# Patient Record
Sex: FEMALE | Race: WHITE | Employment: FULL TIME | URBAN - METROPOLITAN AREA
[De-identification: names, ages, dates, MRNs, and addresses within clinical notes are randomized per-mention and may not be internally consistent; named-entity substitution may affect disease eponyms.]

---

## 2022-03-21 PROBLEM — O09.529 AMA (ADVANCED MATERNAL AGE) MULTIGRAVIDA 35+: Status: ACTIVE | Noted: 2022-03-21

## 2022-03-24 PROBLEM — O09.529 AMA (ADVANCED MATERNAL AGE) MULTIGRAVIDA 35+: Status: ACTIVE | Noted: 2022-03-21

## 2022-05-20 ENCOUNTER — HOSPITAL ENCOUNTER (EMERGENCY)
Age: 37
Discharge: HOME | End: 2022-05-20
Attending: OBSTETRICS & GYNECOLOGY | Admitting: OBSTETRICS & GYNECOLOGY
Payer: COMMERCIAL

## 2022-05-20 VITALS — HEART RATE: 98 BPM | SYSTOLIC BLOOD PRESSURE: 109 MMHG | DIASTOLIC BLOOD PRESSURE: 63 MMHG | TEMPERATURE: 98.3 F

## 2022-05-20 PROCEDURE — 76815 OB US LIMITED FETUS(S): CPT

## 2022-05-20 PROCEDURE — 99283 EMERGENCY DEPT VISIT LOW MDM: CPT

## 2022-05-20 NOTE — DISCHARGE INSTRUCTIONS
Patient Education   Patient Education        Preventing Falls: Care Instructions  Your Care Instructions     Getting around your home safely can be a challenge if you have injuries or health problems that make it easy for you to fall. Loose rugs and furniture in walkways are among the dangers for many older people who have problems walking or who have poor eyesight. People who have conditions such as arthritis, osteoporosis, or dementia also have to be careful not to fall. You can make your home safer with a few simple measures. Follow-up care is a key part of your treatment and safety. Be sure to make and go to all appointments, and call your doctor if you are having problems. It's also a good idea to know your test results and keep a list of the medicines you take. How can you care for yourself at home? Taking care of yourself  · Exercise regularly to improve your strength, muscle tone, and balance. Walk if you can. Swimming may be a good choice if you cannot walk easily. · Have your vision and hearing checked each year or any time you notice a change. If you have trouble seeing and hearing, you might not be able to avoid objects and could lose your balance. · Know the side effects of the medicines you take. Ask your doctor or pharmacist whether the medicines you take can affect your balance. Sleeping pills or sedatives can affect your balance. · Limit the amount of alcohol you drink. Alcohol can impair your balance and other senses. · Ask your doctor whether calluses or corns on your feet need to be removed. If you wear loose-fitting shoes because of calluses or corns, you can lose your balance and fall. · Talk to your doctor if you have numbness in your feet. · You may get dizzy if you do not drink enough water. To prevent dehydration, drink plenty of fluids. Choose water and other clear liquids.  If you have kidney, heart, or liver disease and have to limit fluids, talk with your doctor before you increase the amount of fluids you drink. Preventing falls at home  · Remove raised doorway thresholds, throw rugs, and clutter. Repair loose carpet or raised areas in the floor. · Move furniture and electrical cords to keep them out of walking paths. · Use nonskid floor wax, and wipe up spills right away, especially on ceramic tile floors. · If you use a walker or cane, put rubber tips on it. If you use crutches, clean the bottoms of them regularly with an abrasive pad, such as steel wool. · Keep your house well lit, especially Kathee Deis, and outside walkways. Use night-lights in areas such as hallways and bathrooms. Add extra light switches or use remote switches (such as switches that go on or off when you clap your hands) to make it easier to turn lights on if you have to get up during the night. · Install sturdy handrails on stairways. · Move items in your cabinets so that the things you use a lot are on the lower shelves (about waist level). · Keep a cordless phone and a flashlight with new batteries by your bed. If possible, put a phone in each of the main rooms of your house, or carry a cell phone in case you fall and cannot reach a phone. Or, you can wear a device around your neck or wrist. You push a button that sends a signal for help. · Wear low-heeled shoes that fit well and give your feet good support. Use footwear with nonskid soles. Check the heels and soles of your shoes for wear. Repair or replace worn heels or soles. · Do not wear socks without shoes on wood floors. · Walk on the grass when the sidewalks are slippery. If you live in an area that gets snow and ice in the winter, sprinkle salt on slippery steps and sidewalks. Or ask a family member or friend to do this for you. Preventing falls in the bath  · Install grab bars and nonskid mats inside and outside your shower or tub and near the toilet and sinks. · Use shower chairs and bath benches.   · Use a hand-held shower head that will allow you to sit while showering. · Get into a tub or shower by putting the weaker leg in first. Get out of a tub or shower with your strong side first.  · Repair loose toilet seats and consider installing a raised toilet seat to make getting on and off the toilet easier. · Keep your bathroom door unlocked while you are in the shower. Where can you learn more? Go to http://www.hahn.com/  Enter G117 in the search box to learn more about \"Preventing Falls: Care Instructions. \"  Current as of: September 8, 2021               Content Version: 13.2  © 3410-9433 [employer]. Care instructions adapted under license by [employer] (which disclaims liability or warranty for this information). If you have questions about a medical condition or this instruction, always ask your healthcare professional. [place] [address] any warranty or liability for your use of this information. Vaginal Bleeding During Pregnancy: Care Instructions  Overview     It's common to have some vaginal bleeding when you are pregnant. In some cases, the bleeding isn't serious. And there aren't any more problems with the pregnancy. But sometimes bleeding is a sign of a more serious problem. This is more common if the bleeding is heavy or painful. Examples of more serious problems include miscarriage, an ectopic pregnancy, and a problem with the placenta. You may have to see your doctor again to be sure everything is okay. You may also need more tests to find the cause of the bleeding. Home treatment may be all you need. But it depends on what is causing the bleeding. Be sure to tell your doctor if you have any new symptoms or if your symptoms get worse. The doctor has checked you carefully, but problems can develop later. If you notice any problems or new symptoms, get medical treatment right away. Follow-up care is a key part of your treatment and safety.  Be sure to make and go to all appointments, and call your doctor if you are having problems. It's also a good idea to know your test results and keep a list of the medicines you take. How can you care for yourself at home? · If your doctor prescribed medicines, take them exactly as directed. Call your doctor if you think you are having a problem with your medicine. · Do not have sex until your doctor says it's okay. · Do not put anything in your vagina until your doctor says it's okay. · Ask your doctor about other activities you can or can't do. · Get a lot of rest. Being pregnant can make you tired. · Eat a healthy diet. Include a lot of peas, beans, and leafy green vegetables. Talk to a dietitian if you need help planning your diet. · Do not use nonsteroidal anti-inflammatory drugs (NSAIDs), such as ibuprofen (Advil, Motrin), naproxen (Aleve), or aspirin, unless your doctor says it is okay. When should you call for help? Call 911 anytime you think you may need emergency care. For example, call if:    · You passed out (lost consciousness).     · You have severe vaginal bleeding. Call your doctor now or seek immediate medical care if:    · You have any vaginal bleeding.     · You have pain in your belly or pelvis.     · You think that you are in labor.     · You have a sudden release of fluid from your vagina.     · You notice that your baby has stopped moving or is moving much less than normal.   Watch closely for changes in your health, and be sure to contact your doctor if you have any questions or concerns. Where can you learn more? Go to http://www.gray.com/  Enter L632 in the search box to learn more about \"Vaginal Bleeding During Pregnancy: Care Instructions. \"  Current as of: June 16, 2021               Content Version: 13.2  © 2613-3734 [employer].    Care instructions adapted under license by [employer] (which disclaims liability or warranty for this information). If you have questions about a medical condition or this instruction, always ask your healthcare professional. [place] [address] any warranty or liability for your use of this information.

## 2022-05-20 NOTE — PROGRESS NOTES
OB ED Provider Note    Name: Burak Wan MRN: [id number]     YOB: 1985  Age: 29 y.o. Sex: female      Subjective:     Reason for Presentation to [facility] ED:  almost fell    History of Present Illness: Ms. Luz Elena Angel is a 29 y.o.  at 22w1d gestation with Estimated Date of Delivery: 10/19/22. Her current obstetrical history is significant for uncomplicated pregnancy. She tripped and almost fell but caught herself. Did not hit her abdomen on anything. Shortly after, she had some sharp pain which has since gone away, but came to make sure baby is okay. She has not yet felt fetal movement this pregnancy. She denies vaginal bleeding. She denies leakage of fluid. She reports contractions: none. OB History    Para Term  AB Living   2 0 0 0 1 0   SAB IAB Ectopic Molar Multiple Live Births   1 0 0 0 0 0      # Outcome Date GA Lbr Jluis/2nd Weight Sex Delivery Anes PTL Lv   2 Current            1 SAB              No past medical history on file. No past surgical history on file. Social History     Occupational History    Not on file   Tobacco Use    Smoking status: Never Smoker    Smokeless tobacco: Never Used   Substance and Sexual Activity    Alcohol use: No    Drug use: No    Sexual activity: Yes     Partners: Male     Birth control/protection: None        Allergies   Allergen Reactions    Codeine Sulfate Nausea and Vomiting     Prior to Admission medications    Medication Sig Start Date End Date Taking? Authorizing Provider   prenatal vit 91/iron/folic/dha (PRENATAL + DHA PO) Take  by mouth.     Provider, Historical          Objective:     Vitals:    Visit Vitals  BP (!) 142/87   Pulse (!) 114   Temp 98.3 °F (36.8 °C)   LMP 2022            Wt Readings from Last 1 Encounters:   05/10/22 73.6 kg (162 lb 3.2 oz)       Physical Exam:  Patient with mild distress (until seeing fetal heartbeat)  Abdomen: nontender    FHTs: Normal by ultrasound   Uterine Activity:  None  Presentation: transverse    Lab/Data Review:  No results found for this or any previous visit (from the past 24 hour(s)).     Assessment:  18w2d gestation  S/p near-miss (almost fell)   Obstetrical history significant for uncomplicated pregnancy  Reassuring fetal status  Pt is reassured    Plan:  Discharge home, follow-up at next regular OB appointment

## 2022-05-20 NOTE — PROGRESS NOTES
WADE and Dr. Freida Villanueva at bedside. EFM applied. Audible maternal HR. Pulse ox applied at this time time. Difficult determining fetal tones with EFM. Dr. Freida Villanueva confirmed FHTs with bedside ultrasound within normal range. Fetal movement noted as well.

## 2022-05-21 NOTE — PROGRESS NOTES
Patient discharged to home stable with bleeding precautions. Questions answered and encouraged. Patient and significant other verbalize understanding.

## 2022-05-22 ENCOUNTER — TELEPHONE (OUTPATIENT)
Dept: OBGYN CLINIC | Age: 37
End: 2022-05-22

## 2022-06-07 ENCOUNTER — ROUTINE PRENATAL (OUTPATIENT)
Dept: OBGYN CLINIC | Age: 37
End: 2022-06-07
Payer: COMMERCIAL

## 2022-06-07 VITALS — WEIGHT: 162 LBS | SYSTOLIC BLOOD PRESSURE: 130 MMHG | DIASTOLIC BLOOD PRESSURE: 76 MMHG | BODY MASS INDEX: 29.63 KG/M2

## 2022-06-07 DIAGNOSIS — Z3A.20 20 WEEKS GESTATION OF PREGNANCY: ICD-10-CM

## 2022-06-07 DIAGNOSIS — Z34.82 PRENATAL CARE, SUBSEQUENT PREGNANCY, SECOND TRIMESTER: ICD-10-CM

## 2022-06-07 DIAGNOSIS — Z13.89 SCREENING FOR GENITOURINARY CONDITION: ICD-10-CM

## 2022-06-07 DIAGNOSIS — Z36.3 ANTENATAL SCREENING FOR MALFORMATION USING ULTRASONICS: Primary | ICD-10-CM

## 2022-06-07 LAB
GLUCOSE URINE, POC: NEGATIVE
PROTEIN,URINE, POC: NEGATIVE

## 2022-06-07 PROCEDURE — 76805 OB US >/= 14 WKS SNGL FETUS: CPT | Performed by: OBSTETRICS & GYNECOLOGY

## 2022-06-07 PROCEDURE — 99902 PR PRENATAL VISIT: CPT | Performed by: OBSTETRICS & GYNECOLOGY

## 2022-06-07 NOTE — PROGRESS NOTES
aok  Discussed with pt placenta and EIF  Will recheck all in 4 week  Offered mfm declines at this time normal nipt

## 2022-07-01 ENCOUNTER — TELEPHONE (OUTPATIENT)
Dept: OBGYN CLINIC | Age: 37
End: 2022-07-01

## 2022-07-05 ENCOUNTER — ROUTINE PRENATAL (OUTPATIENT)
Dept: OBGYN CLINIC | Age: 37
End: 2022-07-05

## 2022-07-05 VITALS — DIASTOLIC BLOOD PRESSURE: 80 MMHG | WEIGHT: 168 LBS | BODY MASS INDEX: 30.73 KG/M2 | SYSTOLIC BLOOD PRESSURE: 118 MMHG

## 2022-07-05 DIAGNOSIS — Z34.82 PRENATAL CARE, SUBSEQUENT PREGNANCY, SECOND TRIMESTER: ICD-10-CM

## 2022-07-05 DIAGNOSIS — Z13.89 SCREENING FOR GENITOURINARY CONDITION: ICD-10-CM

## 2022-07-05 DIAGNOSIS — Z3A.24 24 WEEKS GESTATION OF PREGNANCY: Primary | ICD-10-CM

## 2022-07-05 LAB
GLUCOSE URINE, POC: NEGATIVE
PROTEIN,URINE, POC: NEGATIVE

## 2022-07-05 PROCEDURE — 99902 PR PRENATAL VISIT: CPT | Performed by: OBSTETRICS & GYNECOLOGY

## 2022-07-28 ENCOUNTER — ROUTINE PRENATAL (OUTPATIENT)
Dept: OBGYN CLINIC | Age: 37
End: 2022-07-28
Payer: COMMERCIAL

## 2022-07-28 VITALS — DIASTOLIC BLOOD PRESSURE: 70 MMHG | BODY MASS INDEX: 30.98 KG/M2 | SYSTOLIC BLOOD PRESSURE: 124 MMHG | WEIGHT: 169.4 LBS

## 2022-07-28 DIAGNOSIS — Z13.89 SCREENING FOR GENITOURINARY CONDITION: ICD-10-CM

## 2022-07-28 DIAGNOSIS — Z13.1 SCREENING FOR DIABETES MELLITUS: ICD-10-CM

## 2022-07-28 DIAGNOSIS — Z13.0 SCREENING FOR IRON DEFICIENCY ANEMIA: ICD-10-CM

## 2022-07-28 DIAGNOSIS — Z34.82 PRENATAL CARE, SUBSEQUENT PREGNANCY, SECOND TRIMESTER: Primary | ICD-10-CM

## 2022-07-28 DIAGNOSIS — Z36.2 ENCOUNTER FOR FOLLOW-UP ULTRASOUND OF FETAL ANATOMY: ICD-10-CM

## 2022-07-28 DIAGNOSIS — Z3A.28 28 WEEKS GESTATION OF PREGNANCY: ICD-10-CM

## 2022-07-28 LAB
GLUCOSE URINE, POC: NEGATIVE
HGB BLD-MCNC: 12.3 G/DL (ref 11.7–15.4)
PROTEIN,URINE, POC: NEGATIVE

## 2022-07-28 PROCEDURE — 99902 PR PRENATAL VISIT: CPT | Performed by: NURSE PRACTITIONER

## 2022-07-28 PROCEDURE — 76816 OB US FOLLOW-UP PER FETUS: CPT | Performed by: NURSE PRACTITIONER

## 2022-07-28 NOTE — PROGRESS NOTES
Doing well. No complaints. Glucola today  PTL precautions and [address] reviewed. Anatomy complete! RTC 2 weeks. +FHT12  All visualized anatomy appears WNL  No markers for aneuploid seen. Anatomy scan complete  cx >3cm, placenta is no longer low lying  Placenta is posterior.  Anterior/fundal accessory lobe seen  Breech presentation

## 2022-07-29 ENCOUNTER — TELEPHONE (OUTPATIENT)
Dept: OBGYN CLINIC | Age: 37
End: 2022-07-29

## 2022-07-29 DIAGNOSIS — Z34.83 PRENATAL CARE, SUBSEQUENT PREGNANCY, THIRD TRIMESTER: Primary | ICD-10-CM

## 2022-07-29 DIAGNOSIS — Z13.1 SCREENING FOR DIABETES MELLITUS: ICD-10-CM

## 2022-07-29 DIAGNOSIS — Z3A.28 28 WEEKS GESTATION OF PREGNANCY: ICD-10-CM

## 2022-07-29 PROBLEM — O09.529 AMA (ADVANCED MATERNAL AGE) MULTIGRAVIDA 35+: Status: ACTIVE | Noted: 2022-03-21

## 2022-07-29 LAB — GLUCOSE 1 HOUR: 131 MG/DL

## 2022-08-01 ENCOUNTER — NURSE ONLY (OUTPATIENT)
Dept: OBGYN CLINIC | Age: 37
End: 2022-08-01

## 2022-08-01 DIAGNOSIS — Z13.1 SCREENING FOR DIABETES MELLITUS: ICD-10-CM

## 2022-08-01 DIAGNOSIS — Z34.83 PRENATAL CARE, SUBSEQUENT PREGNANCY, THIRD TRIMESTER: ICD-10-CM

## 2022-08-01 DIAGNOSIS — Z3A.28 28 WEEKS GESTATION OF PREGNANCY: ICD-10-CM

## 2022-08-01 LAB
GESTATIONAL 3HR GTT: NORMAL
GLUCOSE 1H P 100 G GLC PO SERPL-MCNC: 142 MG/DL (ref 65–180)
GLUCOSE 2 HOUR: 122 MG/DL (ref 65–155)
GLUCOSE P FAST SERPL-MCNC: 77 MG/DL (ref 65–95)
GLUCOSE, 3 HOUR: 115 MG/DL (ref 65–140)

## 2022-08-12 ENCOUNTER — ROUTINE PRENATAL (OUTPATIENT)
Dept: OBGYN CLINIC | Age: 37
End: 2022-08-12

## 2022-08-12 VITALS — WEIGHT: 171.6 LBS | SYSTOLIC BLOOD PRESSURE: 124 MMHG | BODY MASS INDEX: 31.39 KG/M2 | DIASTOLIC BLOOD PRESSURE: 74 MMHG

## 2022-08-12 DIAGNOSIS — Z3A.30 30 WEEKS GESTATION OF PREGNANCY: ICD-10-CM

## 2022-08-12 DIAGNOSIS — Z13.89 SCREENING FOR GENITOURINARY CONDITION: Primary | ICD-10-CM

## 2022-08-12 DIAGNOSIS — Z34.83 PRENATAL CARE, SUBSEQUENT PREGNANCY, THIRD TRIMESTER: ICD-10-CM

## 2022-08-12 LAB
GLUCOSE URINE, POC: NEGATIVE
PROTEIN,URINE, POC: NEGATIVE

## 2022-08-12 PROCEDURE — 99902 PR PRENATAL VISIT: CPT | Performed by: OBSTETRICS & GYNECOLOGY

## 2022-08-25 ENCOUNTER — ROUTINE PRENATAL (OUTPATIENT)
Dept: OBGYN CLINIC | Age: 37
End: 2022-08-25
Payer: COMMERCIAL

## 2022-08-25 VITALS — DIASTOLIC BLOOD PRESSURE: 78 MMHG | BODY MASS INDEX: 31.61 KG/M2 | SYSTOLIC BLOOD PRESSURE: 122 MMHG | WEIGHT: 172.8 LBS

## 2022-08-25 DIAGNOSIS — Z3A.32 32 WEEKS GESTATION OF PREGNANCY: ICD-10-CM

## 2022-08-25 DIAGNOSIS — Z23 ENCOUNTER FOR IMMUNIZATION: ICD-10-CM

## 2022-08-25 DIAGNOSIS — Z34.83 PRENATAL CARE, SUBSEQUENT PREGNANCY, THIRD TRIMESTER: Primary | ICD-10-CM

## 2022-08-25 DIAGNOSIS — R03.0 ELEVATED BLOOD PRESSURE READING: ICD-10-CM

## 2022-08-25 DIAGNOSIS — Z13.89 SCREENING FOR GENITOURINARY CONDITION: ICD-10-CM

## 2022-08-25 LAB
BASOPHILS # BLD: 0 K/UL (ref 0–0.2)
BASOPHILS NFR BLD: 0 % (ref 0–2)
CREAT UR-MCNC: <13 MG/DL
DIFFERENTIAL METHOD BLD: ABNORMAL
EOSINOPHIL # BLD: 0.1 K/UL (ref 0–0.8)
EOSINOPHIL NFR BLD: 1 % (ref 0.5–7.8)
ERYTHROCYTE [DISTWIDTH] IN BLOOD BY AUTOMATED COUNT: 12.3 % (ref 11.9–14.6)
GLUCOSE URINE, POC: NEGATIVE
HCT VFR BLD AUTO: 36.4 % (ref 35.8–46.3)
HGB BLD-MCNC: 12.2 G/DL (ref 11.7–15.4)
IMM GRANULOCYTES # BLD AUTO: 0.1 K/UL (ref 0–0.5)
IMM GRANULOCYTES NFR BLD AUTO: 1 % (ref 0–5)
LYMPHOCYTES # BLD: 2 K/UL (ref 0.5–4.6)
LYMPHOCYTES NFR BLD: 17 % (ref 13–44)
MCH RBC QN AUTO: 29.6 PG (ref 26.1–32.9)
MCHC RBC AUTO-ENTMCNC: 33.5 G/DL (ref 31.4–35)
MCV RBC AUTO: 88.3 FL (ref 79.6–97.8)
MONOCYTES # BLD: 0.9 K/UL (ref 0.1–1.3)
MONOCYTES NFR BLD: 8 % (ref 4–12)
NEUTS SEG # BLD: 8.8 K/UL (ref 1.7–8.2)
NEUTS SEG NFR BLD: 73 % (ref 43–78)
NRBC # BLD: 0 K/UL (ref 0–0.2)
PLATELET # BLD AUTO: 365 K/UL (ref 150–450)
PMV BLD AUTO: 10 FL (ref 9.4–12.3)
PROT UR-MCNC: <5 MG/DL
PROT/CREAT UR-RTO: NORMAL
PROTEIN,URINE, POC: NEGATIVE
RBC # BLD AUTO: 4.12 M/UL (ref 4.05–5.2)
WBC # BLD AUTO: 11.9 K/UL (ref 4.3–11.1)

## 2022-08-25 PROCEDURE — 90715 TDAP VACCINE 7 YRS/> IM: CPT | Performed by: OBSTETRICS & GYNECOLOGY

## 2022-08-25 PROCEDURE — 99902 PR PRENATAL VISIT: CPT | Performed by: OBSTETRICS & GYNECOLOGY

## 2022-08-25 PROCEDURE — 90471 IMMUNIZATION ADMIN: CPT | Performed by: OBSTETRICS & GYNECOLOGY

## 2022-08-25 NOTE — PROGRESS NOTES
aok except feet VERY swollen. BP good, no PEC s/s. Will get baseline renal fxn.  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

## 2022-08-26 ENCOUNTER — TELEPHONE (OUTPATIENT)
Dept: OBGYN CLINIC | Age: 37
End: 2022-08-26

## 2022-08-26 LAB
ALBUMIN SERPL-MCNC: 2.8 G/DL (ref 3.5–5)
ALBUMIN/GLOB SERPL: 0.9 {RATIO} (ref 1.2–3.5)
ALP SERPL-CCNC: 102 U/L (ref 50–136)
ALT SERPL-CCNC: 13 U/L (ref 12–65)
ANION GAP SERPL CALC-SCNC: 7 MMOL/L (ref 7–16)
AST SERPL-CCNC: 12 U/L (ref 15–37)
BILIRUB SERPL-MCNC: 0.2 MG/DL (ref 0.2–1.1)
BUN SERPL-MCNC: 4 MG/DL (ref 6–23)
CALCIUM SERPL-MCNC: 8.9 MG/DL (ref 8.3–10.4)
CHLORIDE SERPL-SCNC: 108 MMOL/L (ref 98–107)
CO2 SERPL-SCNC: 25 MMOL/L (ref 21–32)
CREAT SERPL-MCNC: 0.5 MG/DL (ref 0.6–1)
GLOBULIN SER CALC-MCNC: 3.1 G/DL (ref 2.3–3.5)
GLUCOSE SERPL-MCNC: 64 MG/DL (ref 65–100)
LDH SERPL L TO P-CCNC: 180 U/L (ref 100–190)
POTASSIUM SERPL-SCNC: 3.2 MMOL/L (ref 3.5–5.1)
PROT SERPL-MCNC: 5.9 G/DL (ref 6.3–8.2)
SODIUM SERPL-SCNC: 140 MMOL/L (ref 136–145)
URATE SERPL-MCNC: 3.1 MG/DL (ref 2.6–6)

## 2022-08-26 NOTE — TELEPHONE ENCOUNTER
----- Message from Leonor Aceves MD sent at 2022  8:33 AM EDT -----  [facility]  All labs are wnl except potassium low jett. Needs to eat bananas or oranges and will be fine. Even better is go buy No Salt at grocery store and use that instead of regular salt.  It has potassium  ----- Message -----  From: Nicolás Lora Incoming Lucan W[employer]  Sent: 2022  11:02 PM EDT  To: Leonor Aceves MD

## 2022-09-01 ENCOUNTER — ROUTINE PRENATAL (OUTPATIENT)
Dept: OBGYN CLINIC | Age: 37
End: 2022-09-01

## 2022-09-01 VITALS — WEIGHT: 175 LBS | BODY MASS INDEX: 32.01 KG/M2 | DIASTOLIC BLOOD PRESSURE: 76 MMHG | SYSTOLIC BLOOD PRESSURE: 128 MMHG

## 2022-09-01 DIAGNOSIS — Z13.89 SCREENING FOR GENITOURINARY CONDITION: ICD-10-CM

## 2022-09-01 DIAGNOSIS — Z34.83 PRENATAL CARE, SUBSEQUENT PREGNANCY, THIRD TRIMESTER: Primary | ICD-10-CM

## 2022-09-01 DIAGNOSIS — Z3A.33 33 WEEKS GESTATION OF PREGNANCY: ICD-10-CM

## 2022-09-01 LAB
GLUCOSE URINE, POC: NEGATIVE
PROTEIN,URINE, POC: NEGATIVE

## 2022-09-01 PROCEDURE — 99902 PR PRENATAL VISIT: CPT | Performed by: NURSE PRACTITIONER

## 2022-09-01 NOTE — PROGRESS NOTES
GFM  Still with BLE edema but working on hydration, low sodium diet and wearing compression hose  No PEC sx  Reviewed BP log, Bpd running 120s/60s-80s. PT reassured.  Continue to keep log and call with sustained elevation or sx  rtc 2 wk

## 2022-09-15 ENCOUNTER — ROUTINE PRENATAL (OUTPATIENT)
Dept: OBGYN CLINIC | Age: 37
End: 2022-09-15

## 2022-09-15 VITALS — DIASTOLIC BLOOD PRESSURE: 72 MMHG | SYSTOLIC BLOOD PRESSURE: 118 MMHG | BODY MASS INDEX: 32.26 KG/M2 | WEIGHT: 176.4 LBS

## 2022-09-15 DIAGNOSIS — Z3A.35 35 WEEKS GESTATION OF PREGNANCY: ICD-10-CM

## 2022-09-15 DIAGNOSIS — Z13.89 SCREENING FOR GENITOURINARY CONDITION: ICD-10-CM

## 2022-09-15 DIAGNOSIS — O09.523 MULTIGRAVIDA OF ADVANCED MATERNAL AGE IN THIRD TRIMESTER: Primary | ICD-10-CM

## 2022-09-15 LAB
GLUCOSE URINE, POC: NEGATIVE
PROTEIN,URINE, POC: NEGATIVE

## 2022-09-15 PROCEDURE — 99902 PR PRENATAL VISIT: CPT | Performed by: OBSTETRICS & GYNECOLOGY

## 2022-09-15 NOTE — PROGRESS NOTES
Patient Active Problem List    Diagnosis Date Noted    AMA (advanced maternal age) multigravida 35+ 03/21/2022   FOB over 8 poundkss  Paternal aunt 12 pound child with shoulder dystocia  Check eFW at 38 weeks

## 2022-09-22 ENCOUNTER — ROUTINE PRENATAL (OUTPATIENT)
Dept: OBGYN CLINIC | Age: 37
End: 2022-09-22

## 2022-09-22 VITALS — SYSTOLIC BLOOD PRESSURE: 128 MMHG | BODY MASS INDEX: 32.78 KG/M2 | WEIGHT: 179.2 LBS | DIASTOLIC BLOOD PRESSURE: 80 MMHG

## 2022-09-22 DIAGNOSIS — Z36.85 ANTENATAL SCREENING FOR STREPTOCOCCUS B: ICD-10-CM

## 2022-09-22 DIAGNOSIS — Z3A.36 36 WEEKS GESTATION OF PREGNANCY: ICD-10-CM

## 2022-09-22 DIAGNOSIS — Z34.83 PRENATAL CARE, SUBSEQUENT PREGNANCY, THIRD TRIMESTER: Primary | ICD-10-CM

## 2022-09-22 DIAGNOSIS — Z13.89 SCREENING FOR GENITOURINARY CONDITION: ICD-10-CM

## 2022-09-22 LAB
GLUCOSE URINE, POC: NEGATIVE
PROTEIN,URINE, POC: NEGATIVE

## 2022-09-22 PROCEDURE — 99902 PR PRENATAL VISIT: CPT | Performed by: NURSE PRACTITIONER

## 2022-09-22 NOTE — PROGRESS NOTES
Doing well. No complaints. GBS today. BLE Edema. Has been checking BP at home and have been running 120s over 70s-80s. Has seeen no elevations. Continue checking. PTL precautions and [address] reviewed. RTC 1 week.

## 2022-09-26 LAB
BACTERIA SPEC CULT: NORMAL
SERVICE CMNT-IMP: NORMAL

## 2022-09-29 ENCOUNTER — ROUTINE PRENATAL (OUTPATIENT)
Dept: OBGYN CLINIC | Age: 37
End: 2022-09-29

## 2022-09-29 VITALS — BODY MASS INDEX: 32.7 KG/M2 | SYSTOLIC BLOOD PRESSURE: 132 MMHG | DIASTOLIC BLOOD PRESSURE: 86 MMHG | WEIGHT: 178.8 LBS

## 2022-09-29 DIAGNOSIS — Z3A.37 37 WEEKS GESTATION OF PREGNANCY: ICD-10-CM

## 2022-09-29 DIAGNOSIS — O09.523 MULTIGRAVIDA OF ADVANCED MATERNAL AGE IN THIRD TRIMESTER: Primary | ICD-10-CM

## 2022-09-29 DIAGNOSIS — Z13.89 SCREENING FOR GENITOURINARY CONDITION: ICD-10-CM

## 2022-09-29 LAB
GLUCOSE URINE, POC: NEGATIVE
PROTEIN,URINE, POC: NEGATIVE

## 2022-09-29 PROCEDURE — 99902 PR PRENATAL VISIT: CPT | Performed by: OBSTETRICS & GYNECOLOGY

## 2022-09-29 NOTE — PROGRESS NOTES
Labor precautions and kick counts. rtc 1 week. gbs negative. Keep blood pressure log.  rtc next week with efw  - already scheduled. Wearing compression stockings. .  Likely sve next week

## 2022-10-03 ENCOUNTER — ANESTHESIA EVENT (OUTPATIENT)
Dept: OPERATING ROOM | Age: 37
End: 2022-10-03
Payer: COMMERCIAL

## 2022-10-03 ENCOUNTER — HOSPITAL ENCOUNTER (INPATIENT)
Age: 37
LOS: 3 days | Discharge: HOME | End: 2022-10-06
Attending: OBSTETRICS & GYNECOLOGY | Admitting: OBSTETRICS & GYNECOLOGY
Payer: COMMERCIAL

## 2022-10-03 ENCOUNTER — ANESTHESIA (OUTPATIENT)
Dept: OPERATING ROOM | Age: 37
End: 2022-10-03
Payer: COMMERCIAL

## 2022-10-03 DIAGNOSIS — Z98.891 S/P CESAREAN SECTION: Primary | ICD-10-CM

## 2022-10-03 LAB
ABO + RH BLD: NORMAL
AMNISURE, POC: POSITIVE
BASE EXCESS BLD CALC-SCNC: 0.9 MMOL/L
BASE EXCESS BLD CALC-SCNC: 1.9 MMOL/L
BASOPHILS # BLD: 0 K/UL (ref 0–0.2)
BASOPHILS NFR BLD: 0 % (ref 0–2)
BLOOD GROUP ANTIBODIES SERPL: NORMAL
DIFFERENTIAL METHOD BLD: ABNORMAL
EOSINOPHIL # BLD: 0 K/UL (ref 0–0.8)
EOSINOPHIL NFR BLD: 0 % (ref 0.5–7.8)
ERYTHROCYTE [DISTWIDTH] IN BLOOD BY AUTOMATED COUNT: 12.8 % (ref 11.9–14.6)
HCO3 BLD-SCNC: 29.2 MMOL/L (ref 22–26)
HCO3 BLDV-SCNC: 25.6 MMOL/L (ref 23–28)
HCT VFR BLD AUTO: 35.9 % (ref 35.8–46.3)
HGB BLD-MCNC: 12.1 G/DL (ref 11.7–15.4)
IMM GRANULOCYTES # BLD AUTO: 0.1 K/UL (ref 0–0.5)
IMM GRANULOCYTES NFR BLD AUTO: 0 % (ref 0–5)
LYMPHOCYTES # BLD: 1.6 K/UL (ref 0.5–4.6)
LYMPHOCYTES NFR BLD: 14 % (ref 13–44)
Lab: NORMAL
MCH RBC QN AUTO: 28.7 PG (ref 26.1–32.9)
MCHC RBC AUTO-ENTMCNC: 33.7 G/DL (ref 31.4–35)
MCV RBC AUTO: 85.1 FL (ref 79.6–97.8)
MONOCYTES # BLD: 0.6 K/UL (ref 0.1–1.3)
MONOCYTES NFR BLD: 5 % (ref 4–12)
NEGATIVE QC PASS/FAIL: NORMAL
NEUTS SEG # BLD: 9.3 K/UL (ref 1.7–8.2)
NEUTS SEG NFR BLD: 80 % (ref 43–78)
NRBC # BLD: 0 K/UL (ref 0–0.2)
PCO2 BLDCO: 40 MMHG (ref 32–68)
PCO2 BLDCO: 55 MMHG (ref 32–68)
PH BLDCO: 7.33 [PH] (ref 7.15–7.38)
PH BLDCO: 7.41 [PH] (ref 7.15–7.38)
PLATELET # BLD AUTO: 351 K/UL (ref 150–450)
PMV BLD AUTO: 9.9 FL (ref 9.4–12.3)
PO2 BLDCO: 90 MMHG
PO2 BLDCO: <5 MMHG
POSITIVE QC PASS/FAIL: NORMAL
RBC # BLD AUTO: 4.22 M/UL (ref 4.05–5.2)
SAO2 % BLDV: 97 % (ref 65–88)
SERVICE CMNT-IMP: ABNORMAL
SERVICE CMNT-IMP: ABNORMAL
SPECIMEN EXP DATE BLD: NORMAL
SPECIMEN TYPE: ABNORMAL
SPECIMEN TYPE: ABNORMAL
WBC # BLD AUTO: 11.6 K/UL (ref 4.3–11.1)

## 2022-10-03 PROCEDURE — 6360000002 HC RX W HCPCS: Performed by: OBSTETRICS & GYNECOLOGY

## 2022-10-03 PROCEDURE — 62325 NJX INTERLAMINAR CRV/THRC: CPT | Performed by: ANESTHESIOLOGY

## 2022-10-03 PROCEDURE — 86901 BLOOD TYPING SEROLOGIC RH(D): CPT

## 2022-10-03 PROCEDURE — 36600 WITHDRAWAL OF ARTERIAL BLOOD: CPT

## 2022-10-03 PROCEDURE — 2709999900 HC NON-CHARGEABLE SUPPLY: Performed by: OBSTETRICS & GYNECOLOGY

## 2022-10-03 PROCEDURE — 2580000003 HC RX 258: Performed by: OBSTETRICS & GYNECOLOGY

## 2022-10-03 PROCEDURE — 1100000000 HC RM PRIVATE

## 2022-10-03 PROCEDURE — 2580000003 HC RX 258: Performed by: ANESTHESIOLOGY

## 2022-10-03 PROCEDURE — 6360000002 HC RX W HCPCS: Performed by: ANESTHESIOLOGY

## 2022-10-03 PROCEDURE — 59025 FETAL NON-STRESS TEST: CPT

## 2022-10-03 PROCEDURE — 2500000003 HC RX 250 WO HCPCS: Performed by: STUDENT IN AN ORGANIZED HEALTH CARE EDUCATION/TRAINING PROGRAM

## 2022-10-03 PROCEDURE — 51701 INSERT BLADDER CATHETER: CPT

## 2022-10-03 PROCEDURE — 2500000003 HC RX 250 WO HCPCS: Performed by: OBSTETRICS & GYNECOLOGY

## 2022-10-03 PROCEDURE — 3E033VJ INTRODUCTION OF OTHER HORMONE INTO PERIPHERAL VEIN, PERCUTANEOUS APPROACH: ICD-10-PCS | Performed by: OBSTETRICS & GYNECOLOGY

## 2022-10-03 PROCEDURE — 3609079900 HC CESAREAN SECTION: Performed by: OBSTETRICS & GYNECOLOGY

## 2022-10-03 PROCEDURE — 4A1HXCZ MONITORING OF PRODUCTS OF CONCEPTION, CARDIAC RATE, EXTERNAL APPROACH: ICD-10-PCS | Performed by: OBSTETRICS & GYNECOLOGY

## 2022-10-03 PROCEDURE — 99285 EMERGENCY DEPT VISIT HI MDM: CPT

## 2022-10-03 PROCEDURE — 3700000001 HC ADD 15 MINUTES (ANESTHESIA): Performed by: OBSTETRICS & GYNECOLOGY

## 2022-10-03 PROCEDURE — 2580000003 HC RX 258: Performed by: STUDENT IN AN ORGANIZED HEALTH CARE EDUCATION/TRAINING PROGRAM

## 2022-10-03 PROCEDURE — 7100000000 HC PACU RECOVERY - FIRST 15 MIN: Performed by: OBSTETRICS & GYNECOLOGY

## 2022-10-03 PROCEDURE — 36415 COLL VENOUS BLD VENIPUNCTURE: CPT

## 2022-10-03 PROCEDURE — 7100000001 HC PACU RECOVERY - ADDTL 15 MIN: Performed by: OBSTETRICS & GYNECOLOGY

## 2022-10-03 PROCEDURE — 6360000002 HC RX W HCPCS: Performed by: STUDENT IN AN ORGANIZED HEALTH CARE EDUCATION/TRAINING PROGRAM

## 2022-10-03 PROCEDURE — 3700000000 HC ANESTHESIA ATTENDED CARE: Performed by: OBSTETRICS & GYNECOLOGY

## 2022-10-03 PROCEDURE — 59510 CESAREAN DELIVERY: CPT | Performed by: OBSTETRICS & GYNECOLOGY

## 2022-10-03 PROCEDURE — 85025 COMPLETE CBC W/AUTO DIFF WBC: CPT

## 2022-10-03 PROCEDURE — 82803 BLOOD GASES ANY COMBINATION: CPT

## 2022-10-03 RX ORDER — KETOROLAC TROMETHAMINE 30 MG/ML
INJECTION, SOLUTION INTRAMUSCULAR; INTRAVENOUS PRN
Status: DISCONTINUED | OUTPATIENT
Start: 2022-10-03 | End: 2022-10-03 | Stop reason: SDUPTHER

## 2022-10-03 RX ORDER — SODIUM CHLORIDE 0.9 % (FLUSH) 0.9 %
5-40 SYRINGE (ML) INJECTION EVERY 12 HOURS SCHEDULED
Status: DISCONTINUED | OUTPATIENT
Start: 2022-10-03 | End: 2022-10-03 | Stop reason: HOSPADM

## 2022-10-03 RX ORDER — SODIUM CHLORIDE 0.9 % (FLUSH) 0.9 %
5-40 SYRINGE (ML) INJECTION PRN
Status: DISCONTINUED | OUTPATIENT
Start: 2022-10-03 | End: 2022-10-03 | Stop reason: HOSPADM

## 2022-10-03 RX ORDER — MORPHINE SULFATE 0.5 MG/ML
INJECTION, SOLUTION EPIDURAL; INTRATHECAL; INTRAVENOUS PRN
Status: DISCONTINUED | OUTPATIENT
Start: 2022-10-03 | End: 2022-10-03 | Stop reason: SDUPTHER

## 2022-10-03 RX ORDER — CARBOPROST TROMETHAMINE 250 UG/ML
250 INJECTION, SOLUTION INTRAMUSCULAR
Status: DISCONTINUED | OUTPATIENT
Start: 2022-10-03 | End: 2022-10-03 | Stop reason: HOSPADM

## 2022-10-03 RX ORDER — ROPIVACAINE HYDROCHLORIDE 5 MG/ML
INJECTION, SOLUTION EPIDURAL; INFILTRATION; PERINEURAL PRN
Status: DISCONTINUED | OUTPATIENT
Start: 2022-10-03 | End: 2022-10-03 | Stop reason: SDUPTHER

## 2022-10-03 RX ORDER — SODIUM CHLORIDE, SODIUM LACTATE, POTASSIUM CHLORIDE, AND CALCIUM CHLORIDE .6; .31; .03; .02 G/100ML; G/100ML; G/100ML; G/100ML
1000 INJECTION, SOLUTION INTRAVENOUS PRN
Status: DISCONTINUED | OUTPATIENT
Start: 2022-10-03 | End: 2022-10-03 | Stop reason: HOSPADM

## 2022-10-03 RX ORDER — DEXTROSE, SODIUM CHLORIDE, SODIUM LACTATE, POTASSIUM CHLORIDE, AND CALCIUM CHLORIDE 5; .6; .31; .03; .02 G/100ML; G/100ML; G/100ML; G/100ML; G/100ML
INJECTION, SOLUTION INTRAVENOUS CONTINUOUS
Status: DISCONTINUED | OUTPATIENT
Start: 2022-10-03 | End: 2022-10-03

## 2022-10-03 RX ORDER — SODIUM CHLORIDE, SODIUM LACTATE, POTASSIUM CHLORIDE, AND CALCIUM CHLORIDE .6; .31; .03; .02 G/100ML; G/100ML; G/100ML; G/100ML
500 INJECTION, SOLUTION INTRAVENOUS PRN
Status: DISCONTINUED | OUTPATIENT
Start: 2022-10-03 | End: 2022-10-03 | Stop reason: HOSPADM

## 2022-10-03 RX ORDER — KETOROLAC TROMETHAMINE 30 MG/ML
15 INJECTION, SOLUTION INTRAMUSCULAR; INTRAVENOUS EVERY 6 HOURS PRN
Status: DISPENSED | OUTPATIENT
Start: 2022-10-03 | End: 2022-10-04

## 2022-10-03 RX ORDER — ONDANSETRON 2 MG/ML
4 INJECTION INTRAMUSCULAR; INTRAVENOUS EVERY 6 HOURS PRN
Status: DISCONTINUED | OUTPATIENT
Start: 2022-10-03 | End: 2022-10-03 | Stop reason: HOSPADM

## 2022-10-03 RX ORDER — ACETAMINOPHEN 325 MG/1
650 TABLET ORAL EVERY 4 HOURS PRN
Status: DISCONTINUED | OUTPATIENT
Start: 2022-10-03 | End: 2022-10-03 | Stop reason: HOSPADM

## 2022-10-03 RX ORDER — SODIUM CHLORIDE, SODIUM LACTATE, POTASSIUM CHLORIDE, CALCIUM CHLORIDE 600; 310; 30; 20 MG/100ML; MG/100ML; MG/100ML; MG/100ML
INJECTION, SOLUTION INTRAVENOUS CONTINUOUS
Status: DISCONTINUED | OUTPATIENT
Start: 2022-10-03 | End: 2022-10-03

## 2022-10-03 RX ORDER — TRANEXAMIC ACID 10 MG/ML
1000 INJECTION, SOLUTION INTRAVENOUS
Status: DISCONTINUED | OUTPATIENT
Start: 2022-10-03 | End: 2022-10-03 | Stop reason: HOSPADM

## 2022-10-03 RX ORDER — LIDOCAINE HYDROCHLORIDE AND EPINEPHRINE 20; 5 MG/ML; UG/ML
INJECTION, SOLUTION EPIDURAL; INFILTRATION; INTRACAUDAL; PERINEURAL PRN
Status: DISCONTINUED | OUTPATIENT
Start: 2022-10-03 | End: 2022-10-03 | Stop reason: SDUPTHER

## 2022-10-03 RX ORDER — HYDROMORPHONE HYDROCHLORIDE 1 MG/ML
0.25 INJECTION, SOLUTION INTRAMUSCULAR; INTRAVENOUS; SUBCUTANEOUS EVERY 6 HOURS PRN
Status: ACTIVE | OUTPATIENT
Start: 2022-10-03 | End: 2022-10-04

## 2022-10-03 RX ORDER — ROPIVACAINE HYDROCHLORIDE 2 MG/ML
INJECTION, SOLUTION EPIDURAL; INFILTRATION; PERINEURAL PRN
Status: DISCONTINUED | OUTPATIENT
Start: 2022-10-03 | End: 2022-10-03 | Stop reason: SDUPTHER

## 2022-10-03 RX ORDER — ONDANSETRON 2 MG/ML
4 INJECTION INTRAMUSCULAR; INTRAVENOUS EVERY 6 HOURS PRN
Status: DISPENSED | OUTPATIENT
Start: 2022-10-03 | End: 2022-10-04

## 2022-10-03 RX ORDER — SODIUM CHLORIDE, SODIUM LACTATE, POTASSIUM CHLORIDE, CALCIUM CHLORIDE 600; 310; 30; 20 MG/100ML; MG/100ML; MG/100ML; MG/100ML
INJECTION, SOLUTION INTRAVENOUS CONTINUOUS PRN
Status: DISCONTINUED | OUTPATIENT
Start: 2022-10-03 | End: 2022-10-03 | Stop reason: SDUPTHER

## 2022-10-03 RX ORDER — SODIUM CHLORIDE 9 MG/ML
25 INJECTION, SOLUTION INTRAVENOUS PRN
Status: DISCONTINUED | OUTPATIENT
Start: 2022-10-03 | End: 2022-10-03 | Stop reason: HOSPADM

## 2022-10-03 RX ORDER — ONDANSETRON 2 MG/ML
INJECTION INTRAMUSCULAR; INTRAVENOUS PRN
Status: DISCONTINUED | OUTPATIENT
Start: 2022-10-03 | End: 2022-10-03 | Stop reason: SDUPTHER

## 2022-10-03 RX ORDER — METHYLERGONOVINE MALEATE 0.2 MG/ML
200 INJECTION INTRAVENOUS
Status: DISCONTINUED | OUTPATIENT
Start: 2022-10-03 | End: 2022-10-03 | Stop reason: HOSPADM

## 2022-10-03 RX ORDER — OXYCODONE HYDROCHLORIDE 5 MG/1
10 TABLET ORAL EVERY 4 HOURS PRN
Status: ACTIVE | OUTPATIENT
Start: 2022-10-03 | End: 2022-10-04

## 2022-10-03 RX ORDER — NALOXONE HYDROCHLORIDE 0.4 MG/ML
INJECTION, SOLUTION INTRAMUSCULAR; INTRAVENOUS; SUBCUTANEOUS PRN
Status: ACTIVE | OUTPATIENT
Start: 2022-10-03 | End: 2022-10-04

## 2022-10-03 RX ORDER — MISOPROSTOL 200 UG/1
800 TABLET ORAL
Status: DISCONTINUED | OUTPATIENT
Start: 2022-10-03 | End: 2022-10-03 | Stop reason: HOSPADM

## 2022-10-03 RX ORDER — SODIUM CHLORIDE, SODIUM LACTATE, POTASSIUM CHLORIDE, CALCIUM CHLORIDE 600; 310; 30; 20 MG/100ML; MG/100ML; MG/100ML; MG/100ML
INJECTION, SOLUTION INTRAVENOUS CONTINUOUS
Status: ACTIVE | OUTPATIENT
Start: 2022-10-03 | End: 2022-10-04

## 2022-10-03 RX ORDER — NALBUPHINE HCL 10 MG/ML
5 AMPUL (ML) INJECTION EVERY 4 HOURS PRN
Status: ACTIVE | OUTPATIENT
Start: 2022-10-03 | End: 2022-10-04

## 2022-10-03 RX ORDER — ACETAMINOPHEN 325 MG/1
650 TABLET ORAL EVERY 4 HOURS PRN
Status: ACTIVE | OUTPATIENT
Start: 2022-10-03 | End: 2022-10-04

## 2022-10-03 RX ORDER — OXYCODONE HYDROCHLORIDE 5 MG/1
5 TABLET ORAL EVERY 4 HOURS PRN
Status: ACTIVE | OUTPATIENT
Start: 2022-10-03 | End: 2022-10-04

## 2022-10-03 RX ADMIN — ROPIVACAINE HYDROCHLORIDE 10 ML: 5 INJECTION, SOLUTION EPIDURAL; INFILTRATION; PERINEURAL at 10:28

## 2022-10-03 RX ADMIN — SODIUM CHLORIDE, POTASSIUM CHLORIDE, SODIUM LACTATE AND CALCIUM CHLORIDE: 600; 310; 30; 20 INJECTION, SOLUTION INTRAVENOUS at 16:03

## 2022-10-03 RX ADMIN — SODIUM CHLORIDE, SODIUM LACTATE, POTASSIUM CHLORIDE, AND CALCIUM CHLORIDE: 600; 310; 30; 20 INJECTION, SOLUTION INTRAVENOUS at 14:44

## 2022-10-03 RX ADMIN — CEFAZOLIN 2000 MG: 10 INJECTION, POWDER, FOR SOLUTION INTRAVENOUS at 14:37

## 2022-10-03 RX ADMIN — AZITHROMYCIN DIHYDRATE 500 MG: 500 INJECTION, POWDER, LYOPHILIZED, FOR SOLUTION INTRAVENOUS at 14:36

## 2022-10-03 RX ADMIN — ONDANSETRON 4 MG: 2 INJECTION INTRAMUSCULAR; INTRAVENOUS at 09:14

## 2022-10-03 RX ADMIN — LIDOCAINE HYDROCHLORIDE AND EPINEPHRINE 5 ML: 20; 5 INJECTION, SOLUTION EPIDURAL; INFILTRATION; INTRACAUDAL; PERINEURAL at 14:35

## 2022-10-03 RX ADMIN — ROPIVACAINE HYDROCHLORIDE 10 ML/HR: 2 INJECTION, SOLUTION EPIDURAL; INFILTRATION; PERINEURAL at 10:32

## 2022-10-03 RX ADMIN — MORPHINE SULFATE 4 MG: 0.5 INJECTION, SOLUTION EPIDURAL; INTRATHECAL; INTRAVENOUS at 15:19

## 2022-10-03 RX ADMIN — ROPIVACAINE HYDROCHLORIDE 10 ML: 2 INJECTION, SOLUTION EPIDURAL; INFILTRATION; PERINEURAL at 10:28

## 2022-10-03 RX ADMIN — SODIUM CHLORIDE, SODIUM LACTATE, POTASSIUM CHLORIDE, AND CALCIUM CHLORIDE: 600; 310; 30; 20 INJECTION, SOLUTION INTRAVENOUS at 15:02

## 2022-10-03 RX ADMIN — Medication 500 ML/HR: at 14:58

## 2022-10-03 RX ADMIN — SODIUM CHLORIDE, SODIUM LACTATE, POTASSIUM CHLORIDE, CALCIUM CHLORIDE AND DEXTROSE MONOHYDRATE: 5; 600; 310; 30; 20 INJECTION, SOLUTION INTRAVENOUS at 08:36

## 2022-10-03 RX ADMIN — LIDOCAINE HYDROCHLORIDE AND EPINEPHRINE 5 ML: 20; 5 INJECTION, SOLUTION EPIDURAL; INFILTRATION; INTRACAUDAL; PERINEURAL at 14:49

## 2022-10-03 RX ADMIN — LIDOCAINE HYDROCHLORIDE AND EPINEPHRINE 5 ML: 20; 5 INJECTION, SOLUTION EPIDURAL; INFILTRATION; INTRACAUDAL; PERINEURAL at 14:39

## 2022-10-03 RX ADMIN — LIDOCAINE HYDROCHLORIDE AND EPINEPHRINE 5 ML: 20; 5 INJECTION, SOLUTION EPIDURAL; INFILTRATION; INTRACAUDAL; PERINEURAL at 14:44

## 2022-10-03 RX ADMIN — Medication 1 MILLI-UNITS/MIN: at 08:35

## 2022-10-03 RX ADMIN — ONDANSETRON 4 MG: 2 INJECTION INTRAMUSCULAR; INTRAVENOUS at 15:00

## 2022-10-03 RX ADMIN — ONDANSETRON 4 MG: 2 INJECTION INTRAMUSCULAR; INTRAVENOUS at 21:26

## 2022-10-03 RX ADMIN — KETOROLAC TROMETHAMINE 30 MG: 30 INJECTION, SOLUTION INTRAMUSCULAR; INTRAVENOUS at 15:21

## 2022-10-03 RX ADMIN — BUTORPHANOL TARTRATE 1 MG: 2 INJECTION, SOLUTION INTRAMUSCULAR; INTRAVENOUS at 08:23

## 2022-10-03 ASSESSMENT — PAIN DESCRIPTION - DESCRIPTORS: DESCRIPTORS: CRAMPING

## 2022-10-03 ASSESSMENT — PAIN DESCRIPTION - LOCATION: LOCATION: ABDOMEN

## 2022-10-03 ASSESSMENT — PAIN SCALES - GENERAL: PAINLEVEL_OUTOF10: 6

## 2022-10-03 ASSESSMENT — PAIN DESCRIPTION - ORIENTATION: ORIENTATION: LOWER

## 2022-10-03 NOTE — PROGRESS NOTES
Oneil Gutierrez at bedside at 1022. Assisted pt to sitting up on bedside at 1025. Timeout completed at 1 with MD, CRNA and myself at bedside. Test dose given at 1027. Negative reaction. Dose given at 1028. Pt assisted to lying back in left tilt position. See anesthesia record for details. See vital sign flow sheet for BP. Tolerated procedure well.

## 2022-10-03 NOTE — ANESTHESIA PRE PROCEDURE
Department of Anesthesiology  Preprocedure Note       Name:  Gay Nina   Age:  40 y.o.  :  1985                                          MRN:  [id number]         Date:  10/3/2022      Surgeon: * No surgeons listed *    Procedure: * No procedures listed *    Medications prior to admission:   Prior to Admission medications    Medication Sig Start Date End Date Taking?  Authorizing Provider   Prenatal Vit w/Ls-Jstysadyp-TG (PNV PO) Take by mouth    Historical Provider, MD       Current medications:    Current Facility-Administered Medications   Medication Dose Route Frequency Provider Last Rate Last Admin    lactated ringers bolus  500 mL IntraVENous PRN Coleman Cruz MD        Or    lactated ringers bolus  1,000 mL IntraVENous PRN Coleman Cruz MD        sodium chloride flush 0.9 % injection 5-40 mL  5-40 mL IntraVENous 2 times per day Coleman Cruz MD        sodium chloride flush 0.9 % injection 5-40 mL  5-40 mL IntraVENous PRN Coleman Cruz MD        0.9 % sodium chloride infusion  25 mL IntraVENous PRN Coleman rCuz MD        ondansetron [facility]) injection 4 mg  4 mg IntraVENous Q6H PRN Coleman Cruz MD   4 mg at 10/03/22 0914    methylergonovine (METHERGINE) injection 200 mcg  200 mcg IntraMUSCular Once PRN Coleman Cruz MD        carboprost (HEMABATE) injection 250 mcg  250 mcg IntraMUSCular Once PRN Coleman Cruz MD        miSOPROStol (CYTOTEC) tablet 800 mcg  800 mcg Rectal Once PRN Coleman Cruz MD        tranexamic acid-NaCl IVPB premix 1,000 mg  1,000 mg IntraVENous Once PRN Coleman Cruz MD        acetaminophen (TYLENOL) tablet 650 mg  650 mg Oral Q4H PRN Coleman Cruz MD        butorphanol (STADOL) injection 1 mg  1 mg IntraVENous Q3H PRN Coleman Cruz MD   1 mg at 10/03/22 0823    oxytocin (PITOCIN) 30 units in 500 mL infusion  1-20 karen-units/min IntraVENous Continuous Coleman Cruz MD 8 mL/hr at 10/03/22 1035 8 karen-units/min at 10/03/22 1035    dextrose 5 % in lactated ringers infusion   IntraVENous Continuous Luana Goncalves  mL/hr at 10/03/22 0836 New Bag at 10/03/22 0836     Facility-Administered Medications Ordered in Other Encounters   Medication Dose Route Frequency Provider Last Rate Last Admin    ropivacaine (NAROPIN) 0.5% injection   Epidural PRN Abhishek Rock MD   10 mL at 10/03/22 1028       Allergies: Allergies   Allergen Reactions    Codeine Nausea And Vomiting     Other reaction(s): Nausea and/or vomiting-Intolerance       Problem List:    Patient Active Problem List   Diagnosis Code    AMA (advanced maternal age) multigravida 33+ O12.46     premature rupture of membranes (PPROM) with onset of labor within 24 hours of rupture in third trimester, antepartum O42.013       Past Medical History:  No past medical history on file. Past Surgical History:  No past surgical history on file. Social History:    Social History     Tobacco Use    Smoking status: Never    Smokeless tobacco: Never   Substance Use Topics    Alcohol use:  No                                Counseling given: Not Answered      Vital Signs (Current):   Vitals:    10/03/22 1034 10/03/22 1035 10/03/22 1036 10/03/22 1037   BP: 137/77 132/72 129/72 127/72   Pulse: 73 70 79 72   Resp:       Temp:       TempSrc:       SpO2:                                                  BP Readings from Last 3 Encounters:   10/03/22 127/72   22 132/86   22 128/80       NPO Status:                                                                                 BMI:   Wt Readings from Last 3 Encounters:   22 178 lb 12.8 oz (81.1 kg)   22 179 lb 3.2 oz (81.3 kg)   09/15/22 176 lb 6.4 oz (80 kg)     There is no height or weight on file to calculate BMI.    CBC:   Lab Results   Component Value Date/Time    WBC 11.6 10/03/2022 08:05 AM    RBC 4.22 10/03/2022 08:05 AM    HGB 12.1 10/03/2022 08:05 AM    HCT 35.9 10/03/2022 08:05 AM    MCV 85.1 10/03/2022 08:05 AM    RDW 12.8 10/03/2022 08:05 AM     10/03/2022 08:05 AM       CMP:   Lab Results   Component Value Date/Time     08/25/2022 04:08 PM    K 3.2 08/25/2022 04:08 PM     08/25/2022 04:08 PM    CO2 25 08/25/2022 04:08 PM    BUN 4 08/25/2022 04:08 PM    CREATININE 0.50 08/25/2022 04:08 PM    GFRAA >60 08/25/2022 04:08 PM    LABGLOM >60 08/25/2022 04:08 PM    GLUCOSE 64 08/25/2022 04:08 PM    PROT 5.9 08/25/2022 04:08 PM    CALCIUM 8.9 08/25/2022 04:08 PM    BILITOT 0.2 08/25/2022 04:08 PM    ALKPHOS 102 08/25/2022 04:08 PM    AST 12 08/25/2022 04:08 PM    ALT 13 08/25/2022 04:08 PM       POC Tests: No results for input(s): POCGLU, POCNA, POCK, POCCL, POCBUN, POCHEMO, POCHCT in the last 72 hours. Coags: No results found for: PROTIME, INR, APTT    HCG (If Applicable): No results found for: PREGTESTUR, PREGSERUM, HCG, HCGQUANT     ABGs: No results found for: PHART, PO2ART, OOW7JZW, UYO9BVP, BEART, Y6VPJSQE     Type & Screen (If Applicable):  No results found for: LABABO, LABRH    Drug/Infectious Status (If Applicable):  No results found for: HIV, HEPCAB    COVID-19 Screening (If Applicable): No results found for: COVID19        Anesthesia Evaluation  Patient summary reviewed and Nursing notes reviewed  Airway: Mallampati: II  TM distance: >3 FB   Neck ROM: full  Mouth opening: > = 3 FB   Dental: normal exam         Pulmonary:Negative Pulmonary ROS breath sounds clear to auscultation                             Cardiovascular:  Exercise tolerance: good (>4 METS),           Rhythm: regular  Rate: normal                    Neuro/Psych:   Negative Neuro/Psych ROS              GI/Hepatic/Renal:        Morbid obesity: obese. Endo/Other: Negative Endo/Other ROS                    Abdominal:             Vascular: negative vascular ROS. Other Findings:           Anesthesia Plan      epidural     ASA 2 - emergent     (Addendum: Pt found to be breech at 8 cm dilation.   To OR for emergent CS.)        Anesthetic plan and risks discussed with patient.                         Becki Thompson MD   10/3/2022

## 2022-10-03 NOTE — ANESTHESIA POSTPROCEDURE EVALUATION
Department of Anesthesiology  Postprocedure Note    Patient: Rose Ramos  MRN: [id number]  YOB: 1985  Date of evaluation: 10/3/2022      Procedure Summary     Date: 10/03/22 [place] [facility] [place] [facility] [place]  [facility] [place] [facility]    Anesthesia Start: 1022 Anesthesia Stop:     Procedures:        SECTION (Abdomen)      Labor Analgesia Diagnosis:       Breech presentation, fetus 3 of multiple gestation      (primary  section for breech presentation)    Surgeons: David Brooks MD Responsible Provider: Annemarie Robin MD    Anesthesia Type: epidural ASA Status: 2 - Emergent          Anesthesia Type: No value filed.     Aditi Phase I:      Aditi Phase II:        Anesthesia Post Evaluation    Patient location during evaluation: PACU  Patient participation: complete - patient participated  Level of consciousness: awake and alert  Airway patency: patent  Nausea & Vomiting: no nausea  Cardiovascular status: hemodynamically stable  Respiratory status: acceptable  Hydration status: euvolemic

## 2022-10-03 NOTE — ANESTHESIA PROCEDURE NOTES
Epidural Block    Patient location during procedure: OB  Start time: 10/3/2022 10:22 AM  End time: 10/3/2022 10:28 AM  Reason for block: procedure for pain and labor epidural  Staffing  Performed: anesthesiologist   Anesthesiologist: Ted Grant MD  Epidural  Patient position: sitting  Prep: ChloraPrep  Patient monitoring: continuous pulse ox and frequent blood pressure checks  Approach: midline  Location: L3-4  Injection technique: TABITHA saline  Provider prep: mask and sterile gloves  Needle  Needle type: Tuohy   Needle gauge: 17 G  Needle length: 3.5 in  Catheter type: end hole  Catheter size: 19 G  Test dose: negativeCatheter Secured: tegaderm  Assessment  Hemodynamics: stable  Attempts: 1  Outcomes: uncomplicated  Preanesthetic Checklist  Completed: patient identified, IV checked, site marked, risks and benefits discussed, surgical/procedural consents, equipment checked, timeout performed, anesthesia consent given, oxygen available and monitors applied/VS acknowledged

## 2022-10-03 NOTE — OP NOTE
INTRAUTERINE PREGNANCY  SECTION     Kia Gandhi  [id number]    DATE OF PROCEDURE:  10/03/22      PREOPERATIVE DIAGNOSIS:  primary  section for breech presentation    POSTOPERATIVE DIAGNOSIS:  Same as preoperative diagnosis      with operative delivery of a  3220 gm female with Apgars of 9 and 9. ADDITIONAL DIAGNOSES: Breech presentation    PROCEDURE: Intrauterine pregnancy,  section low transverse    SURGEON:  Alba Lei MD, MD    ASSISTANT: None    ANESTHESIA: Epidural    QBL: 1145 ml    SPECIMENS: * No specimens in log *     COMPLICATIONS: None    OPERATIVE PROCEDURE: Patient was placed on the operating room table in the supine position/left lateral tilt, frances catheter in place, pneumatic compression hose on and operating. Time out was done to confirm the operating procedure, surgeon, patient and site. Once confirmed by the team, procedure was started. The patient was prepped and draped in the usual fashion for abdominal surgery. Adequate anesthesia was confirmed, A Pfannenstiel incision was made and carried down sharply through the skin, subcutaneous tissue, and fascia. Fascia was sharply dissected free from underlying rectus muscles. The peritoneum was sharply entered and extended vertically with good visualization of bowel and bladder. The bladder blade was then placed over the bladder. The visceroperitoneal reflection over the lower uterine segment was incised transversely and the bladder flap sharply and bluntly developed. The bladder blade was reinserted in this space. A low transverse hysterotomy incision was made with return of thin meconium stained fluid then extended bluntly, and the infants breech was delivered from the pelvis with gentle fundal pressure to the level of the chest.  Infant's arms were then swept over the chest head flexed and infant's head delivered also. The cord was clamped and cut. Mouth and nose were suctioned clean.  The infant was given to the SCN personel present at the time of delivery. The placenta was delivered with fundal massage. The uterus was exteriorized, wrapped in a wet lap square, curetted with a dry square, and closed in a double-layered fashion with 0-vicryl. Hemostasis appeared adequate. The cul-de-sac was then irrigated and suctioned clean. Bladder flap was irrigated. The uterus was replaced in the abdomen. The gutters were irrigated and suctioned clean. The hysterotomy incision was noted to be hemostatic. The peritoneum was closed with 3-0 vicryl and rectus muscle reapproximated with 0-vicryl. The subfascial layer made hemostatic with electrocautery. Fascia closed with #1-vicryl running. Subcutaneous tissue irrigated, noted to be hemostatic and reapproximated with 3-0 vicryl. Skin then closed with 4-0 monocryl in a subcuticular fashion. All sponge, lap, instrument, and needle counts correct times two. The patient tolerated the procedure well and went back to her room in satisfactory condition. Infant was with the mother.

## 2022-10-03 NOTE — H&P
History & Physical    Name: Noé Kiser MRN: [id number]  SSN: [id number]    YOB: 1985  Age: 40 y.o. Sex: female        Subjective: LOF  HPI: 41 yo  at 37+5wks presents with lof starting at 0500. She reports clear fluid. She is having some cramping, but denies regular, painful ctxs or vb. Her pregnancy has been c/b AMA. GBS status is negative. Estimated Date of Delivery: 10/19/22  OB History    Para Term  AB Living   2   0 0 1     SAB IAB Ectopic Molar Multiple Live Births   1                # Outcome Date GA Lbr Ronnie/2nd Weight Sex Delivery Anes PTL Lv   2 Current            1 SAB                No past medical history on file. Denies    No past surgical history on file. Denies    Social History     Occupational History    Not on file   Tobacco Use    Smoking status: Never    Smokeless tobacco: Never   Substance and Sexual Activity    Alcohol use: No    Drug use: No    Sexual activity: Not on file     Family History   Problem Relation Age of Onset    Breast Cancer Other         PGGM    Hypertension Father     Hypertension Maternal Grandmother        Allergies   Allergen Reactions    Codeine Nausea And Vomiting     Other reaction(s): Nausea and/or vomiting-Intolerance     Prior to Admission medications    Medication Sig Start Date End Date Taking? Authorizing Provider   Prenatal Vit w/Mb-[id number]-PK (PNV PO) Take by mouth    Historical Provider, MD        Review of Systems: Pertinent items are noted in HPI. 10 point ROS is otherwise positive for general discomforts in late pregnancy/early labor.     Objective:     Vitals:  Vitals:    10/03/22 0624 10/03/22 0634   BP: (!) 140/93 127/78   Pulse: 98 83   Resp:  20   Temp:  98.3 °F (36.8 °C)        Physical Exam:   Patient without distress  Heart: Regular rate and rhythm  Lung: clear to auscultation throughout lung fields and normal respiratory effort  Abdomen: soft, nontender  Fundus: soft and non tender  Perineum: blood absent, amniotic fluidpresent  Cervical Exam: 1 cm dilated    80% effaced    -2 station    Lower Extremities:  - Edema 1+  Membranes:  Premature Rupture of Membranes; Amniotic Fluid: clear fluid  Fetal Heart Rate: Baseline: 125-135 per minute  Variability: moderate  Accelerations: yes  Decelerations: none  Uterine contractions: none  Amnisure POSITIVE    Prenatal Labs:   Lab Results   Component Value Date/Time    RUBELLAEXT immune 2022 12:00 AM    HBSAGEXT negative 2022 12:00 AM    HIVEXT NR 2022 12:00 AM    RPREXT NR 2022 12:00 AM         Assessment/Plan: 41 yo  at 37+5 wks presents with PROM. Principal Problem:     premature rupture of membranes (PPROM) with onset of labor within 24 hours of rupture in third trimester, antepartum  Resolved Problems:    * No resolved hospital problems. *       Plan:   Admit  Start Pitocin augmentation  Epidural prn  Initial BP was elevated likely due to anxiety. F/u BP wnl. Will monitor.    Dr. Jodi Stark updated

## 2022-10-03 NOTE — PROGRESS NOTES
Labor Progress Note  Patient seen, fetal heart rate and contraction pattern evaluated, patient examined. Patient Vitals for the past 1 hrs:   BP Temp Temp src Pulse Resp SpO2   10/03/22 0821 127/72 98.3 °F (36.8 °C) Oral 88 18 98 %       Physical Exam:  Cervical Exam:  not checked  Membranes:  Spontaneous Rupture of Membranes;  Amniotic Fluid: clear fluid  Uterine Activity: irreg  Fetal Heart Rate: Reactive    Assessment/Plan:  Reassuring fetal status, continue IOL for SROM 11-Dec-2019

## 2022-10-03 NOTE — L&D DELIVERY NOTE
Mother's Information      Labor Events     Labor?: No  Cervical Ripening:   Now               Roman Killer [[id number]]      Labor Events     Labor?: No   Steroids?: None  Cervical Ripening Date/Time:     Antibiotics Received during Labor?: No  Rupture Identifier: Sac 1   Rupture Date/Time: 10/3/22 04:00:00   Rupture Type: SROM  Fluid Color: Meconium  Meconium Consistency: Thin  Fluid Odor: None  Fluid Volume:  Moderate  Induction: Oxytocin  Augmentation: Oxytocin  Labor Complications: None       Anesthesia    Method: Epidural       Start Pushing      Labor onset date/time: 10/3/22 08:35:00 Now     Dilation complete date/time:   Now     Start pushing date/time:    Decision date/time (emergent ):           Labor Length    3rd stage: 1h 02m       Delivery ()      Delivery Date/Time:  10/3/22 14:57:00   Delivery Type: , Low Transverse    Details:  Trial of Labor?: Yes    Categorization: Primary    Priority: Non-scheduled   Indications for : Breech   Skin Incision Type: Pfannenstiel     Uterine Incision: Low Transverse              Presentation    Presentation: Breech  Position: Left  _: Sacrum  _: Transverse       Shoulder Dystocia    Shoulder Dystocia Present?: No  Add Second Maneuver  Add Third Maneuver  Add Fourth Maneuver  Add Fifth Maneuver  Add Sixth Maneuver  Add Seventh Maneuver  Add Eighth Maneuver  Add Ninth Maneuver       Assisted Delivery Details    Forceps Attempted?: No  Vacuum Extractor Attempted?: No       Document Additional Attempt         Document Additional Attempt                 Cord    Vessels: 3 Vessels  Complications: None  Delayed Cord Clamping?: Yes  Cord Clamped Date/Time: 10/3/2022 15:58:00  Cord Blood Disposition: Lab  Gases Sent?: Yes       Placenta    Date/Time: 10/3/2022 15:59:00  Removal: Spontaneous  Appearance: Intact  Disposition: Discarded       Lacerations    Episiotomy: None  Perineal Lacerations: None  Other Lacerations: no non-perineal laceration       Vaginal Counts    Initial Count Personnel: N/A  Initial Count Verified By: N/A    Sponges [employer]   Initial Counts      Final Counts      Final Count Personnel: N/A  Final Count Verified By: N/A  If the count is incorrect due to Intentionally Retained Foreign Object (IRFO) add the IRFO LDA in Lines/Drains. Add LDA: Link to [facility]       Blood Loss  Mother: Nilda Busby" #[id number]     Start of Mother's Information      Delivery Blood Loss  10/03/22 0835 - 10/03/22 1536      Quantitative Blood Loss (mL) Hospital Encounter 1145 grams    Total  1145 mL               End of Mother's Information  Mother: Nilda Busby" #[id number]                Delivery Providers    Delivering clinician: Indy Goodson MD     Provider Role    Indy Goodson MD Obstetrician    Nicolette Rubi, RN Primary Nurse    Tana Martinez, RN Primary [place] Nurse    [facility]    Maria D Pepe Gipson MD Anesthesiologist    Marisa Segura, APRN - CRNA Nurse Anesthetist    Rico Lopez, RCP Respiratory Therapist               Assessment    Living Status: Living  Delivery Location Comment: 02     Apgar Scoring Key:    0 1 2    Skin Color: Blue or pale Acrocyanotic Completely pink    Heart Rate: Absent <100 bpm >100 bpm    Reflex Irritability: No response Grimace Cry or active withdrawal    Muscle Tone: Limp Some flexion Active motion    Respiratory Effort: Absent Weak cry; hypoventilation Good, crying                      Skin Color:   Heart Rate:   Reflex Irritability:   Muscle Tone:   Respiratory Effort:    Total:            1 Minute:    1    2    2    2    2    9        Apgar 1 total from OB History    5 Minute:    1    2    2    2    2    9        Apgar 5 total from OB History    10 Minute:              15 Minute:              20 Minute:                        Apgars

## 2022-10-04 ENCOUNTER — ANESTHESIA EVENT (OUTPATIENT)
Dept: MOTHER INFANT UNIT | Age: 37
End: 2022-10-04

## 2022-10-04 ENCOUNTER — ANESTHESIA (OUTPATIENT)
Dept: MOTHER INFANT UNIT | Age: 37
End: 2022-10-04

## 2022-10-04 PROBLEM — Z98.891 S/P CESAREAN SECTION: Status: ACTIVE | Noted: 2022-10-04

## 2022-10-04 LAB — HGB BLD-MCNC: 9.8 G/DL (ref 11.7–15.4)

## 2022-10-04 PROCEDURE — 85018 HEMOGLOBIN: CPT

## 2022-10-04 PROCEDURE — 6370000000 HC RX 637 (ALT 250 FOR IP): Performed by: OBSTETRICS & GYNECOLOGY

## 2022-10-04 PROCEDURE — 36415 COLL VENOUS BLD VENIPUNCTURE: CPT

## 2022-10-04 PROCEDURE — 6360000002 HC RX W HCPCS: Performed by: ANESTHESIOLOGY

## 2022-10-04 PROCEDURE — 1100000000 HC RM PRIVATE

## 2022-10-04 RX ORDER — FAMOTIDINE 20 MG/1
20 TABLET, FILM COATED ORAL 2 TIMES DAILY
Status: DISCONTINUED | OUTPATIENT
Start: 2022-10-04 | End: 2022-10-06 | Stop reason: HOSPADM

## 2022-10-04 RX ORDER — SIMETHICONE 80 MG
80 TABLET,CHEWABLE ORAL EVERY 6 HOURS PRN
Status: DISCONTINUED | OUTPATIENT
Start: 2022-10-04 | End: 2022-10-06 | Stop reason: HOSPADM

## 2022-10-04 RX ORDER — ACETAMINOPHEN 500 MG
1000 TABLET ORAL EVERY 8 HOURS PRN
Status: DISCONTINUED | OUTPATIENT
Start: 2022-10-04 | End: 2022-10-06 | Stop reason: HOSPADM

## 2022-10-04 RX ORDER — DOCUSATE SODIUM 100 MG/1
100 CAPSULE, LIQUID FILLED ORAL 2 TIMES DAILY
Status: DISCONTINUED | OUTPATIENT
Start: 2022-10-04 | End: 2022-10-06 | Stop reason: HOSPADM

## 2022-10-04 RX ORDER — METHYLERGONOVINE MALEATE 0.2 MG/ML
200 INJECTION INTRAVENOUS PRN
Status: DISCONTINUED | OUTPATIENT
Start: 2022-10-04 | End: 2022-10-06 | Stop reason: HOSPADM

## 2022-10-04 RX ORDER — DIPHENHYDRAMINE HYDROCHLORIDE 50 MG/ML
25 INJECTION INTRAMUSCULAR; INTRAVENOUS EVERY 6 HOURS PRN
Status: DISCONTINUED | OUTPATIENT
Start: 2022-10-04 | End: 2022-10-06 | Stop reason: HOSPADM

## 2022-10-04 RX ORDER — OXYCODONE HYDROCHLORIDE 5 MG/1
5 TABLET ORAL EVERY 4 HOURS PRN
Status: DISCONTINUED | OUTPATIENT
Start: 2022-10-04 | End: 2022-10-06 | Stop reason: HOSPADM

## 2022-10-04 RX ORDER — SODIUM CHLORIDE, SODIUM LACTATE, POTASSIUM CHLORIDE, CALCIUM CHLORIDE 600; 310; 30; 20 MG/100ML; MG/100ML; MG/100ML; MG/100ML
INJECTION, SOLUTION INTRAVENOUS CONTINUOUS
Status: DISCONTINUED | OUTPATIENT
Start: 2022-10-04 | End: 2022-10-05

## 2022-10-04 RX ORDER — SODIUM CHLORIDE 0.9 % (FLUSH) 0.9 %
5-40 SYRINGE (ML) INJECTION PRN
Status: DISCONTINUED | OUTPATIENT
Start: 2022-10-04 | End: 2022-10-05

## 2022-10-04 RX ORDER — LANOLIN
CREAM (ML) TOPICAL
Status: DISCONTINUED | OUTPATIENT
Start: 2022-10-04 | End: 2022-10-06 | Stop reason: HOSPADM

## 2022-10-04 RX ORDER — MISOPROSTOL 200 UG/1
200 TABLET ORAL PRN
Status: DISCONTINUED | OUTPATIENT
Start: 2022-10-04 | End: 2022-10-06 | Stop reason: HOSPADM

## 2022-10-04 RX ORDER — SODIUM CHLORIDE 9 MG/ML
INJECTION, SOLUTION INTRAVENOUS PRN
Status: DISCONTINUED | OUTPATIENT
Start: 2022-10-04 | End: 2022-10-05

## 2022-10-04 RX ORDER — ONDANSETRON 8 MG/1
8 TABLET, ORALLY DISINTEGRATING ORAL EVERY 8 HOURS PRN
Status: DISCONTINUED | OUTPATIENT
Start: 2022-10-04 | End: 2022-10-06 | Stop reason: HOSPADM

## 2022-10-04 RX ORDER — IBUPROFEN 800 MG/1
800 TABLET ORAL EVERY 8 HOURS
Status: DISCONTINUED | OUTPATIENT
Start: 2022-10-04 | End: 2022-10-06 | Stop reason: HOSPADM

## 2022-10-04 RX ORDER — OXYCODONE HYDROCHLORIDE 5 MG/1
10 TABLET ORAL EVERY 4 HOURS PRN
Status: DISCONTINUED | OUTPATIENT
Start: 2022-10-04 | End: 2022-10-06 | Stop reason: HOSPADM

## 2022-10-04 RX ORDER — SODIUM CHLORIDE 0.9 % (FLUSH) 0.9 %
5-40 SYRINGE (ML) INJECTION EVERY 12 HOURS SCHEDULED
Status: DISCONTINUED | OUTPATIENT
Start: 2022-10-04 | End: 2022-10-05

## 2022-10-04 RX ADMIN — DOCUSATE SODIUM 100 MG: 100 CAPSULE ORAL at 20:52

## 2022-10-04 RX ADMIN — KETOROLAC TROMETHAMINE 15 MG: 30 INJECTION, SOLUTION INTRAMUSCULAR at 01:22

## 2022-10-04 RX ADMIN — KETOROLAC TROMETHAMINE 15 MG: 30 INJECTION, SOLUTION INTRAMUSCULAR at 14:42

## 2022-10-04 RX ADMIN — SIMETHICONE 80 MG: 80 TABLET, CHEWABLE ORAL at 20:52

## 2022-10-04 RX ADMIN — KETOROLAC TROMETHAMINE 15 MG: 30 INJECTION, SOLUTION INTRAMUSCULAR at 09:00

## 2022-10-04 ASSESSMENT — PAIN DESCRIPTION - ORIENTATION: ORIENTATION: ANTERIOR;LOWER

## 2022-10-04 ASSESSMENT — PAIN DESCRIPTION - LOCATION: LOCATION: INCISION

## 2022-10-04 ASSESSMENT — PAIN DESCRIPTION - DESCRIPTORS: DESCRIPTORS: ACHING

## 2022-10-04 ASSESSMENT — PAIN SCALES - GENERAL: PAINLEVEL_OUTOF10: 2

## 2022-10-04 NOTE — LACTATION NOTE

## 2022-10-04 NOTE — ANESTHESIA POST-OP
Patient is POD 1 s/p  and received neuraxial morphine for post-op pain control. /81   Pulse 95   Temp 98.4 °F (36.9 °C) (Oral)   Resp 20   LMP 2022   SpO2 96%   Breastfeeding Unknown , airway patent, patient appropriately hydrated and appears euvolemic. She reports minimal incisional pain. Patient reports minimal pruritis and no nausea. Her lower extremities have returned to baseline neurologically. She was satisfied with the anesthetic and reports no complications. Continue current orders.

## 2022-10-04 NOTE — CARE COORDINATION
Chart reviewed - first time parent. SW met with patient to complete initial assessment.  provided education on [facility] [facility].  Family was undecided on need for home visit. No referral will be made at this time. Family has this 's contact information should they decide to participate in program.    Patient given informational packet on  mood & anxiety disorders (resources/education). Family denies any additional needs from  at this time. Family has 's contact information should any needs/questions arise.     EULOGIO Lutz, [address] [facility]   [phone]

## 2022-10-04 NOTE — LACTATION NOTE
This note was copied from a baby's chart. In to see mom and infant for the first time. Infant was latched and sucking rhythmically on mom's right breast in the cradle hold. Reviewed with mom how to position infant to get nd maintain a deeper latch. Mom stated that it did feel better. Reviewed expectations of the first 24 hours as well as the second night. Lactation consultant will follow up tomorrow.

## 2022-10-04 NOTE — PROGRESS NOTES
Post-Operative Day Number 1 Progress Note    Patient doing well post-op day 1 from  delivery without significant complaints. Pain controlled on current medication. Voiding without difficulty, normal lochia. Vitals:  Patient Vitals for the past 8 hrs:   BP Temp Temp src Pulse Resp SpO2   10/04/22 1030 116/86 98.5 °F (36.9 °C) Oral 86 18 96 %   10/04/22 0746 114/71 98.7 °F (37.1 °C) Oral 79 16 96 %     Temp (24hrs), Av.6 °F (37 °C), Min:98.4 °F (36.9 °C), Max:98.8 °F (37.1 °C)      Vital signs stable, afebrile. Exam:  Patient without distress. Abdomen soft, fundus firm at level of umbilicus, non tender. Lower extremities are negative for swelling, cords or tenderness. Lab/Data Review:  Lab Results   Component Value Date    WBC 11.6 (H) 10/03/2022    HGB 9.8 (L) 10/04/2022    HCT 35.9 10/03/2022    MCV 85.1 10/03/2022     10/03/2022         Assessment and Plan:  Patient appears to be having uncomplicated post- course. Continue routine post-op care and maternal education.

## 2022-10-04 NOTE — PROGRESS NOTES
Assisted out of bed to bathroom with minimal assist. Washed up in bathroom. Meghan care instructions given. Pt.verbalizes understanding. Returned to bed. Callbell within reach.

## 2022-10-05 PROCEDURE — 6360000002 HC RX W HCPCS: Performed by: OBSTETRICS & GYNECOLOGY

## 2022-10-05 PROCEDURE — 90471 IMMUNIZATION ADMIN: CPT | Performed by: OBSTETRICS & GYNECOLOGY

## 2022-10-05 PROCEDURE — 90686 IIV4 VACC NO PRSV 0.5 ML IM: CPT | Performed by: OBSTETRICS & GYNECOLOGY

## 2022-10-05 PROCEDURE — 1100000000 HC RM PRIVATE

## 2022-10-05 PROCEDURE — 3E02340 INTRODUCTION OF INFLUENZA VACCINE INTO MUSCLE, PERCUTANEOUS APPROACH: ICD-10-PCS | Performed by: OBSTETRICS & GYNECOLOGY

## 2022-10-05 RX ADMIN — INFLUENZA VIRUS VACCINE 0.5 ML: 15; 15; 15; 15 SUSPENSION INTRAMUSCULAR at 08:52

## 2022-10-05 NOTE — PROGRESS NOTES
Post-Partum Day Number 2 Progress Note    Patient doing well  without significant complaints. Pain controlled on current medication. Voiding without difficulty, normal lochia. Vitals:  /78   Pulse 67   Temp 99.2 °F (37.3 °C) (Oral)   Resp 16   LMP 01/12/2022   SpO2 98%   Breastfeeding Unknown     Vital signs stable, afebrile. Exam:  Patient without distress. Heart rrr  Lungs cta b&s               Abdomen soft, fundus firm at level of umbilicus, nontender. Incision clean, dry and intact. Lower extremities are negative for swelling, cords or tenderness. No results found for: [facility]     Lab Results   Component Value Date/Time    ABORH A POSITIVE 10/03/2022 08:05 AM    ABSCRNEXT negative 03/21/2022 12:00 AM    RUBELLAEXT immune 03/21/2022 12:00 AM     Lab Results   Component Value Date/Time    HGB 9.8 10/04/2022 05:38 AM         Assessment and Plan:  Patient appears to be having uncomplicated post-partum course. Continue routine post-delivery care and maternal education. Breastfeeding. Anticipate discharge home tomorrow. Acute blood loss anemia but asymptomatic.

## 2022-10-05 NOTE — LACTATION NOTE
This note was copied from a baby's chart. Assisted with attempt at breast in cross cradle, cradle and football on R. No latch. Interested, but could not maintain latch. Better in football per mom. Mom already pumping and supplementing. Got 5 ml at last session. Discussed milk storage. Discussed normal  behavior. May take baby a little while to figure out how to nurse well and consistently. Plan to continued trying at breast and pumping if no latch. Will follow output and weight loss. Will continue to assist with positioning and technique. Encouraged attempt at breast and follow plan.

## 2022-10-06 VITALS
HEART RATE: 68 BPM | SYSTOLIC BLOOD PRESSURE: 138 MMHG | TEMPERATURE: 98.4 F | DIASTOLIC BLOOD PRESSURE: 90 MMHG | RESPIRATION RATE: 18 BRPM | OXYGEN SATURATION: 100 %

## 2022-10-06 RX ORDER — IBUPROFEN 800 MG/1
800 TABLET ORAL EVERY 8 HOURS PRN
Qty: 30 TABLET | Refills: 0 | Status: SHIPPED | OUTPATIENT
Start: 2022-10-06

## 2022-10-06 RX ORDER — ACETAMINOPHEN 500 MG
1000 TABLET ORAL EVERY 6 HOURS PRN
Qty: 30 TABLET | Refills: 0 | Status: SHIPPED | OUTPATIENT
Start: 2022-10-06

## 2022-10-06 NOTE — DISCHARGE INSTRUCTIONS
Pelvic rest for 6wk  NO driving for 2wk or while taking pain medications  NO tub baths, pools, or hot tubs for 6wk. NO push/pull motion such as vacuuming or sweeping for 2wk  NO lifting >10lb for 2wk.  Section: What to Expect at [address] [place] [address]     A  section, or , is surgery to deliver your baby through a cut that the doctor makes in your lower belly and uterus. The cut is called an incision. You may have some pain in your lower belly and need pain medicine for 1 to 2 weeks. You can expect some vaginal bleeding for several weeks. You will probably need about 6 weeks to fully recover. It's important to take it easy while the incision heals. Avoid heavy lifting, strenuous activities, and exercises that strain the belly muscles while you recover. Ask a family member or friend for help with housework, cooking, and shopping. This care sheet gives you a general idea about how long it will take for you to recover. But each person recovers at a different pace. Follow the steps below to get better as quickly as possible. How can you care for yourself at home? Activity    Rest when you feel tired. Getting enough sleep will help you recover. Try to walk each day. Start by walking a little more than you did the day before. Bit by bit, increase the amount you walk. Walking boosts blood flow and helps prevent pneumonia, constipation, and blood clots. Avoid strenuous activities, such as bicycle riding, jogging, weightlifting, and aerobic exercise, for 6 weeks or until your doctor says it is okay. Until your doctor says it is okay, do not lift anything heavier than your baby. Do not do sit-ups or other exercises that strain the belly muscles for 6 weeks or until your doctor says it is okay. Hold a pillow over your incision when you cough or take deep breaths. This will support your belly and decrease your pain.      You may shower as usual. Pat the incision dry when you are done. You will have some vaginal bleeding. Wear sanitary pads. Do not douche or use tampons until your doctor says it is okay. Ask your doctor when you can drive again. You will probably need to take at least 6 weeks off work. It depends on the type of work you do and how you feel. Ask your doctor when it is okay for you to have sex. Diet    You can eat your normal diet. If your stomach is upset, try bland, low-fat foods like plain rice, broiled chicken, toast, and yogurt. Drink plenty of fluids (unless your doctor tells you not to). You may notice that your bowel movements are not regular right after your surgery. This is common. Try to avoid constipation and straining with bowel movements. You may want to take a fiber supplement every day. If you have not had a bowel movement after a couple of days, ask your doctor about taking a mild laxative. If you are breastfeeding, limit alcohol. Alcohol can cause a lack of energy and other health problems for the baby when a breastfeeding woman drinks heavily. It can also get in the way of a mom's ability to feed her baby or to care for the child in other ways. There isn't a lot of research about exactly how much alcohol can harm a baby. Having no alcohol is the safest choice for your baby. If you choose to have a drink now and then, have only one drink, and limit the number of occasions that you have a drink. Wait to breastfeed at least 2 hours after you have a drink to reduce the amount of alcohol the baby may get in the milk. Medicines    Your doctor will tell you if and when you can restart your medicines. You will also get instructions about taking any new medicines. If you stopped taking aspirin or some other blood thinner, your doctor will tell you when to start taking it again. Take pain medicines exactly as directed. If the doctor gave you a prescription medicine for pain, take it as prescribed.   If you are not taking a prescription pain medicine, ask your doctor if you can take an over-the-counter medicine. If you think your pain medicine is making you sick to your stomach: Take your medicine after meals (unless your doctor has told you not to). Ask your doctor for a different pain medicine. If your doctor prescribed antibiotics, take them as directed. Do not stop taking them just because you feel better. You need to take the full course of antibiotics. Incision care    If you have strips of tape on the incision, leave the tape on for a week or until it falls off. Wash the area daily with warm, soapy water, and pat it dry. Don't use hydrogen peroxide or alcohol, which can slow healing. You may cover the area with a gauze bandage if it weeps or rubs against clothing. Change the bandage every day. Keep the area clean and dry. Other instructions    If you breastfeed your baby, you may be more comfortable while you are healing if you don't rest your baby on your belly. Try tucking your baby under your arm, with your baby's body along the side you will be feeding on. Support your baby's upper body with your arm. With that hand you can control your baby's head to bring your baby's mouth to your breast. This is sometimes called the football hold. Follow-up care is a key part of your treatment and safety. Be sure to make and go to all appointments, and call your doctor if you are having problems. It's also a good idea to know your test results and keep a list of the medicines you take. When should you call for help? Share this information with your partner, family, or a friend. They can help you watch for warning signs. Call 911  anytime you think you may need emergency care. For example, call if:    You have thoughts of harming yourself, your baby, or another person. You passed out (lost consciousness). You have chest pain, are short of breath, or cough up blood. You have a seizure.    Call your doctor now or seek immediate medical care if:    You have loose stitches, or your incision comes open. You have signs of hemorrhage (too much bleeding), such as:  Heavy vaginal bleeding. This means that you are soaking through one or more pads in an hour. Or you pass blood clots bigger than an egg. Feeling dizzy or lightheaded, or you feel like you may faint. Feeling so tired or weak that you cannot do your usual activities. A fast or irregular heartbeat. New or worse belly pain. You have symptoms of infection, such as: Increased pain, swelling, warmth, or redness. Red streaks leading from the incision. Pus draining from the incision. A fever. Vaginal discharge that smells bad. New or worse belly pain. You have symptoms of a blood clot in your leg (called a deep vein thrombosis), such as:  Pain in your calf, back of the knee, thigh, or groin. Redness and swelling in your leg or groin. You have signs of preeclampsia, such as:  Sudden swelling of your face, hands, or feet. New vision problems (such as dimness, blurring, or seeing spots). A severe headache. Watch closely for changes in your health, and be sure to contact your doctor if:    Your vaginal bleeding isn't decreasing. You feel sad, anxious, or hopeless for more than a few days. You are having problems with your breasts or breastfeeding. Where can you learn more? Go to https://Join [employer]. org and sign in to your [employer] account. Enter M806 in the State [facility] facility box to learn more about \" Section: What to Expect at Home. \"     If you do not have an account, please click on the \"Sign Up Now\" link. Current as of: 2022               Content Version: 13.4   [employer]. Care instructions adapted under license by [facility] ([facility]).  If you have questions about a medical condition or this instruction, always ask your healthcare professional. [employer] disclaims any warranty or liability for your use of this information.

## 2022-10-06 NOTE — LACTATION NOTE
This note was copied from a baby's chart. Individualized Feeding Plan for Breastfeeding   Lactation Services [phone]    As much as possible, hold your baby on your chest so babys bare skin is against your bare skin with a blanket covering babys back, especially 30 minutes before it is time for baby to eat. Watch for early feeding cues such as, licking lips, sucking motions, rooting, hands to mouth. Crying is a late feeding cue. Feed your baby at least 8 times in 24 hours, or more if your baby is showing feeding cues. If baby is sleepy put baby skin to skin and watch for hunger cues. To rouse baby: unwrap, undress, massage hands, feet, & back, change diaper, gently change babys position from lying to sitting. 15-20 minutes on the first breast of active breastfeeding is considered a good feeding. Good, active breastfeeding is when baby is alert, tugging the nipple, their ear may move, and you can hear swallows. Allow baby to finish the first side before changing sides. Sleeping at the breast or only brief, light sucks should not be considered a good, full breastfeed. At each feeding:  __x__1. Do Suck Practice on finger before each feeding until sucking pattern is smooth. Try using index finger. Nail down towards tongue. __x__2. Hand Express for a few minutes prior to latching to help start milk flow. __x__3. Baby needs to NURSE WELL x 15-20 minutes on at least first breast, burp and offer 2nd breast at every feeding. If no sustained latch only attempt at breast for 10 minutes. If baby does not latch on and feed well on at least one side, you should:   __x__4. Double pump for 15 minutes with breast massage and compression. Hand express for an additional 2-3 minutes per side. Pump after each feeding attempt or poor feeding, up to 8 times per day. If you are not putting baby to the breast you need to pump 8 times a day. Pump every 3 hours. __x__5.  Give baby all of the breast milk you obtain using a straight syringe or  curved syringe. If baby does NOT have enough wet and dirty diapers per day, is jaundiced/lethargic, or has significant weight loss AND you do NOT pump enough milk for each feeding (per volume listed below), formula supplementation may need to be used. Call lactation department /pediatrician if you have concerns. AVERAGE INTAKES OF COLOSTRUM BY HEALTHY  INFANTS:  Time  Day Intake (ml per feeding)  Based on 8 feedings per day. 48-72 hrs  3 15-30 ml (0.5-1 oz)  72-96 hrs  4 30-45 ml (1-1.5oz) Based on every 3 hour feeds                           5        45-60 ml (1.5-2oz)                           6         60-75 ml (2-2.5oz)                           7         75-90 ml (2.5-3oz)      By day 7, baby will need 71 ml or 2.5 oz at each feeding based on 8 feedings per day & babys weight. (1oz = 30ml). Total milk volume needed in 24 hours by Day 7 is 19 oz per day based on baby's birthweight of 7 lbs 2oz. The more often baby eats, the less volume they need per feeding. If baby is eating more often than the minimum of 8 times per day, they may take less per feeding. If pumping, suggest using olive oil or coconut oil on your nipples before pumping to help reduce the friction. Use feeding plan until follow up with pediatrician. Continue to attempt at the breast for most feeds. Pump every 3 hours if no latch. Give all pumped colostrum/breastmilk at each feeding. OUTPATIENT APPOINTMENT Suggested. Outpatient services are located on the 4th floor at [address] [place] [address]. Check in at the 4th floor registration desk (the same one you used when you came to have your baby).   Call for questions [phone]

## 2022-10-06 NOTE — PROGRESS NOTES
Post-Partum Day Number 3 Progress Note    Patient doing well  without significant complaints. Pain controlled on current medication. Voiding without difficulty, normal lochia. Vitals:  /84   Pulse 72   Temp 98.2 °F (36.8 °C) (Oral)   Resp 16   LMP 01/12/2022   SpO2 98%   Breastfeeding Unknown     Vital signs stable, afebrile. Exam:  Patient without distress. Heart rrr  Lungs cta b&s               Abdomen soft, fundus firm at level of umbilicus, nontender. Incision clean, dry and intact. Lower extremities are negative for swelling, cords or tenderness. No results found for: LABABO, [address]     Lab Results   Component Value Date/Time    ABORH A POSITIVE 10/03/2022 08:05 AM    ABSCRNEXT negative 03/21/2022 12:00 AM    RUBELLAEXT immune 03/21/2022 12:00 AM     Lab Results   Component Value Date/Time    HGB 9.8 10/04/2022 05:38 AM         Assessment and Plan:  Patient appears to be having uncomplicated post-partum course. Continue routine post-delivery care and maternal education. Breastfeeding.   Will discharge home

## 2022-10-06 NOTE — LACTATION NOTE
This note was copied from a baby's chart. In to see mom and infant prior to discharge to home. Mom stated that infant is latching and nursing much better today. She stated that she has nursed well the past three feedings and was very happy with her progress. Reviewed discharge information to include feeding plan. Offered an outpatient lactation follow up but she declined stating the she will contact lactation consultant as needed.

## 2022-10-06 NOTE — DISCHARGE SUMMARY
Obstetrical Discharge Summary     Name: Tasha Rivera MRN: [id number]  SSN: [id number]    YOB: 1985  Age: 40 y.o. Sex: female      Allergies: Codeine    Admit Date: 10/3/2022    Discharge Date: 10/6/2022     Admitting Physician: Brynn Beck MD     Attending Physician: Brynn Beck MD     * Admission Diagnoses:  premature rupture of membranes (PPROM) with onset of labor within 24 hours of rupture in third trimester, antepartum [O42.013]  S/P  section [M11.292]    * Discharge Diagnoses: Additional Diagnoses: Principal Problem (Resolved):     premature rupture of membranes (PPROM) with onset of labor within 24 hours of rupture in third trimester, antepartum  Active Problems:    S/P  section  Resolved Problems:     delivery delivered     Lab Results   Component Value Date/Time    ABORH A POSITIVE 10/03/2022 08:05 AM    RUBELLAEXT immune 2022 12:00 AM      Immunization History   Administered Date(s) Administered    Influenza, FLUARIX, FLULAVAL, FLUZONE (age 10 mo+) AND AFLURIA, (age 1 y+), PF, 0.5mL 10/05/2022    Tdap (Boostrix, Adacel) 2022       * Procedures:    section - low transverse delivery         * Discharge Condition: Good    [facility] Course: Normal hospital course following the delivery.     * Disposition: Home    Discharge Medications:      Medication List        START taking these medications      acetaminophen 500 MG tablet  Commonly known as: TYLENOL  Take 2 tablets by mouth every 6 hours as needed for Pain     ibuprofen 800 MG tablet  Commonly known as: ADVIL;MOTRIN  Take 1 tablet by mouth every 8 hours as needed for Pain            CONTINUE taking these medications      PNV PO               Where to Get Your Medications        These medications were sent to [address] [place] [address], [address] [place] [address]      Phone: [phone] acetaminophen 500 MG tablet  ibuprofen 800 MG tablet         * Follow-up Care/Patient Instructions:   Activity: no sex for 6 weeks  Diet: regular diet  Wound Care: as directed    Follow up: 2 weeks

## 2022-10-17 NOTE — PROGRESS NOTES
2 Week Postpartum Visit    Name: Augustus Marion    Date: 10/18/2022    Age: 40 y.o.    OB History          2    Para   1    Term   1       0    AB   1    Living   1         SAB   1    IAB        Ectopic        Molar        Multiple   0    Live Births   1              /80   Ht 5' 2\" (1.575 m)   Wt 149 lb 6.4 oz (67.8 kg)   LMP 2022        Delivered by Dr. Kin Spain on 10/3/22 by primary . Infant's Name: [place] Dennise  Infant's Gender: Female  Birth Weight: 7lb 1.6oz   Feeding: breast and bottle feeding. Supplementing with formula  Desired Contraception: will discuss today  Complaints: None    Current Outpatient Medications   Medication Sig Dispense Refill    norethindrone (MICRONOR) 0.35 MG tablet Take 1 tablet by mouth daily 28 tablet 11    ibuprofen (ADVIL;MOTRIN) 800 MG tablet Take 1 tablet by mouth every 8 hours as needed for Pain 30 tablet 0    acetaminophen (TYLENOL) 500 MG tablet Take 2 tablets by mouth every 6 hours as needed for Pain 30 tablet 0    Prenatal Vit w/Kn-Zcxumrevv-DU (PNV PO) Take by mouth       No current facility-administered medications for this visit. Physical Exam  Physical Exam  Constitutional:       Appearance: Normal appearance. Neurological:      Mental Status: She is alert and oriented to person, place, and time. Psychiatric:         Mood and Affect: Mood normal.         Behavior: Behavior normal.   Vitals reviewed. Moods stable  Lochia appropriate  Wants to start POP for BC--will send micronor, pt to start at 4 wk PP. Pfannenstiel incision c/d/I and healing well. Breasts with minimal discomfort. Rtc 4 wks for 6 wk PP      No orders of the defined types were placed in this encounter. No follow-up provider specified. Supervising physician is Dr. Kin Spain. Greater than 50% of this 20 minute visit is spent in counseling to the above topics.

## 2022-10-18 ENCOUNTER — POSTPARTUM VISIT (OUTPATIENT)
Dept: OBGYN CLINIC | Age: 37
End: 2022-10-18

## 2022-10-18 VITALS
DIASTOLIC BLOOD PRESSURE: 80 MMHG | WEIGHT: 149.4 LBS | BODY MASS INDEX: 27.49 KG/M2 | SYSTOLIC BLOOD PRESSURE: 122 MMHG | HEIGHT: 62 IN

## 2022-10-18 PROCEDURE — 99902 PR PRENATAL VISIT: CPT | Performed by: NURSE PRACTITIONER

## 2022-10-18 RX ORDER — ACETAMINOPHEN AND CODEINE PHOSPHATE 120; 12 MG/5ML; MG/5ML
1 SOLUTION ORAL DAILY
Qty: 28 TABLET | Refills: 11 | Status: SHIPPED | OUTPATIENT
Start: 2022-10-18

## 2022-11-15 ENCOUNTER — POSTPARTUM VISIT (OUTPATIENT)
Dept: OBGYN CLINIC | Age: 37
End: 2022-11-15

## 2022-11-15 VITALS
HEIGHT: 62 IN | SYSTOLIC BLOOD PRESSURE: 124 MMHG | WEIGHT: 150.4 LBS | DIASTOLIC BLOOD PRESSURE: 82 MMHG | BODY MASS INDEX: 27.68 KG/M2

## 2022-11-15 PROCEDURE — 99902 PR PRENATAL VISIT: CPT | Performed by: OBSTETRICS & GYNECOLOGY

## 2022-11-15 NOTE — PROGRESS NOTES
[facility] presents for postop visit from  about 6 weeks ago. Doing well postoperatively. without any bleeding. Reports:no fever or chills  Voiding well:yes. Bowel movements OK:yes. Breastfeeding yes  Last pap smear: 2022 Negative. HPV Negative. Contraception Plan Micronor already started    Patient Active Problem List    Diagnosis Date Noted    S/P  section 10/04/2022    AMA (advanced maternal age) multigravida 35+ 2022        Exam: Blood pressure 124/82, height 5' 2\" (1.575 m), weight 150 lb 6.4 oz (68.2 kg), last menstrual period 2022, currently breastfeeding. A&OX3, NAD. Abdomen:  Non tender Incision clean, dry, intact. Normal bimanual      A/P. Stable Post op condition. Gradually increase activity. Resumption of sexual activity is  encouraged at this time. Follow up  PRN.

## 2023-10-02 RX ORDER — ACETAMINOPHEN AND CODEINE PHOSPHATE 120; 12 MG/5ML; MG/5ML
1 SOLUTION ORAL DAILY
Qty: 28 TABLET | Refills: 11 | Status: SHIPPED | OUTPATIENT
Start: 2023-10-02

## 2023-10-10 SDOH — ECONOMIC STABILITY: TRANSPORTATION INSECURITY
IN THE PAST 12 MONTHS, HAS LACK OF TRANSPORTATION KEPT YOU FROM MEETINGS, WORK, OR FROM GETTING THINGS NEEDED FOR DAILY LIVING?: NO

## 2023-10-10 SDOH — ECONOMIC STABILITY: INCOME INSECURITY: HOW HARD IS IT FOR YOU TO PAY FOR THE VERY BASICS LIKE FOOD, HOUSING, MEDICAL CARE, AND HEATING?: NOT HARD AT ALL

## 2023-10-10 SDOH — ECONOMIC STABILITY: FOOD INSECURITY: WITHIN THE PAST 12 MONTHS, THE FOOD YOU BOUGHT JUST DIDN'T LAST AND YOU DIDN'T HAVE MONEY TO GET MORE.: NEVER TRUE

## 2023-10-10 SDOH — ECONOMIC STABILITY: FOOD INSECURITY: WITHIN THE PAST 12 MONTHS, YOU WORRIED THAT YOUR FOOD WOULD RUN OUT BEFORE YOU GOT MONEY TO BUY MORE.: NEVER TRUE

## 2023-10-10 SDOH — ECONOMIC STABILITY: HOUSING INSECURITY
IN THE LAST 12 MONTHS, WAS THERE A TIME WHEN YOU DID NOT HAVE A STEADY PLACE TO SLEEP OR SLEPT IN A SHELTER (INCLUDING NOW)?: NO

## 2023-10-12 NOTE — PROGRESS NOTES
Patient presents today for a routine gynecological examination with no complaints. Breast feeding. No complaints. Considering pregnancy next year    OB History          2    Para   1    Term   1       0    AB   1    Living   1         SAB   1    IAB        Ectopic        Molar        Multiple   0    Live Births   1              Pap smear: 2022 Negative , HPV negative     GYN History           Patient's last menstrual period was 2023. Cycle Length irregular  Lasting irregular  negative dysmenorrhea; negative postcoital bleeding    Past Medical History:  History reviewed. No pertinent past medical history. Past Surgical History:  Past Surgical History:   Procedure Laterality Date     SECTION N/A 10/3/2022     SECTION performed by Michael Peterson MD at [facility]       Allergies: Allergies   Allergen Reactions    Codeine Nausea And Vomiting     Other reaction(s): Nausea and/or vomiting-Intolerance       Medication History:  Current Outpatient Medications   Medication Sig Dispense Refill    norethindrone (MICRONOR) 0.35 MG tablet TAKE ONE TABLET BY MOUTH EVERY DAY 28 tablet 11    Prenatal Vit w/Yd-Phguqyiwa-PN (PNV PO) Take by mouth       No current facility-administered medications for this visit.        Social History:  Social History     Socioeconomic History    Marital status:      Spouse name: Not on file    Number of children: Not on file    Years of education: Not on file    Highest education level: Not on file   Occupational History    Not on file   Tobacco Use    Smoking status: Never    Smokeless tobacco: Never   Substance and Sexual Activity    Alcohol use: No    Drug use: No    Sexual activity: Not on file   Other Topics Concern    Not on file   Social History Narrative    Not on file     Social Determinants of Health     Financial Resource Strain: Not on file   Food Insecurity: Not on file   Transportation Needs: Not on file   Physical Activity: Not on file

## 2023-10-13 ENCOUNTER — OFFICE VISIT (OUTPATIENT)
Dept: OBGYN CLINIC | Age: 38
End: 2023-10-13
Payer: COMMERCIAL

## 2023-10-13 VITALS
DIASTOLIC BLOOD PRESSURE: 60 MMHG | BODY MASS INDEX: 30.4 KG/M2 | SYSTOLIC BLOOD PRESSURE: 110 MMHG | WEIGHT: 165.2 LBS | HEIGHT: 62 IN

## 2023-10-13 DIAGNOSIS — Z13.89 SCREENING FOR GENITOURINARY CONDITION: ICD-10-CM

## 2023-10-13 DIAGNOSIS — Z01.419 WELL WOMAN EXAM: Primary | ICD-10-CM

## 2023-10-13 LAB
BILIRUBIN, URINE, POC: NEGATIVE
BLOOD URINE, POC: NORMAL
GLUCOSE URINE, POC: NEGATIVE
KETONES, URINE, POC: NEGATIVE
LEUKOCYTE ESTERASE, URINE, POC: NEGATIVE
NITRITE, URINE, POC: NEGATIVE
PH, URINE, POC: 6 (ref 4.6–8)
PROTEIN,URINE, POC: NEGATIVE
SPECIFIC GRAVITY, URINE, POC: 1.02 (ref 1–1.03)
URINALYSIS CLARITY, POC: CLEAR
URINALYSIS COLOR, POC: YELLOW
UROBILINOGEN, POC: NORMAL

## 2023-10-13 PROCEDURE — 81002 URINALYSIS NONAUTO W/O SCOPE: CPT | Performed by: NURSE PRACTITIONER

## 2023-10-13 PROCEDURE — 99395 PREV VISIT EST AGE 18-39: CPT | Performed by: NURSE PRACTITIONER

## 2024-08-12 DIAGNOSIS — Z87.59 HISTORY OF MISCARRIAGE: ICD-10-CM

## 2024-08-12 DIAGNOSIS — Z32.01 POSITIVE URINE PREGNANCY TEST: Primary | ICD-10-CM

## 2024-08-12 DIAGNOSIS — N92.6 MISSED PERIOD: Primary | ICD-10-CM

## 2024-08-13 ENCOUNTER — LAB (OUTPATIENT)
Dept: OBGYN CLINIC | Age: 39
End: 2024-08-13

## 2024-08-13 DIAGNOSIS — Z32.01 POSITIVE URINE PREGNANCY TEST: ICD-10-CM

## 2024-08-13 DIAGNOSIS — Z32.01 POSITIVE PREGNANCY TEST: Primary | ICD-10-CM

## 2024-08-13 DIAGNOSIS — Z87.59 HISTORY OF MISCARRIAGE: ICD-10-CM

## 2024-08-13 LAB — HCG SERPL-ACNC: 408 MIU/ML

## 2024-08-15 ENCOUNTER — LAB (OUTPATIENT)
Dept: OBGYN CLINIC | Age: 39
End: 2024-08-15

## 2024-08-15 DIAGNOSIS — Z87.59 HISTORY OF MISCARRIAGE: ICD-10-CM

## 2024-08-15 DIAGNOSIS — Z32.01 POSITIVE PREGNANCY TEST: ICD-10-CM

## 2024-08-15 LAB — HCG SERPL-ACNC: 822 MIU/ML

## 2024-08-16 ENCOUNTER — TELEPHONE (OUTPATIENT)
Dept: OBGYN CLINIC | Age: 39
End: 2024-08-16

## 2024-08-16 NOTE — TELEPHONE ENCOUNTER
----- Message from LEX Borges CNP sent at 8/16/2024  8:20 AM EDT -----  Excellent qhcg rise.   FU for conf US and visit 7-8wk ega by lmp  Pain/bleeding/ectopic precautions

## 2024-09-09 ENCOUNTER — PROCEDURE VISIT (OUTPATIENT)
Dept: OBGYN CLINIC | Age: 39
End: 2024-09-09
Payer: COMMERCIAL

## 2024-09-09 ENCOUNTER — OFFICE VISIT (OUTPATIENT)
Dept: OBGYN CLINIC | Age: 39
End: 2024-09-09
Payer: COMMERCIAL

## 2024-09-09 VITALS
DIASTOLIC BLOOD PRESSURE: 70 MMHG | SYSTOLIC BLOOD PRESSURE: 122 MMHG | WEIGHT: 169.4 LBS | BODY MASS INDEX: 31.17 KG/M2 | HEIGHT: 62 IN

## 2024-09-09 DIAGNOSIS — N92.6 MISSED MENSES: Primary | ICD-10-CM

## 2024-09-09 PROCEDURE — 76817 TRANSVAGINAL US OBSTETRIC: CPT | Performed by: OBSTETRICS & GYNECOLOGY

## 2024-09-09 PROCEDURE — 99214 OFFICE O/P EST MOD 30 MIN: CPT | Performed by: NURSE PRACTITIONER

## 2024-09-24 ENCOUNTER — OFFICE VISIT (OUTPATIENT)
Dept: OBGYN CLINIC | Age: 39
End: 2024-09-24
Payer: COMMERCIAL

## 2024-09-24 ENCOUNTER — INITIAL PRENATAL (OUTPATIENT)
Dept: OBGYN CLINIC | Age: 39
End: 2024-09-24

## 2024-09-24 ENCOUNTER — PROCEDURE VISIT (OUTPATIENT)
Dept: OBGYN CLINIC | Age: 39
End: 2024-09-24
Payer: COMMERCIAL

## 2024-09-24 DIAGNOSIS — Z3A.09 9 WEEKS GESTATION OF PREGNANCY: ICD-10-CM

## 2024-09-24 DIAGNOSIS — Z98.891 HISTORY OF C-SECTION: ICD-10-CM

## 2024-09-24 DIAGNOSIS — Z34.81 PRENATAL CARE, SUBSEQUENT PREGNANCY, FIRST TRIMESTER: Primary | ICD-10-CM

## 2024-09-24 DIAGNOSIS — O02.1 MISSED AB: Primary | ICD-10-CM

## 2024-09-24 DIAGNOSIS — O02.1 MISSED ABORTION: Primary | ICD-10-CM

## 2024-09-24 PROCEDURE — 76817 TRANSVAGINAL US OBSTETRIC: CPT | Performed by: STUDENT IN AN ORGANIZED HEALTH CARE EDUCATION/TRAINING PROGRAM

## 2024-09-24 PROCEDURE — 99214 OFFICE O/P EST MOD 30 MIN: CPT | Performed by: NURSE PRACTITIONER

## 2024-09-25 ENCOUNTER — ANESTHESIA EVENT (OUTPATIENT)
Dept: SURGERY | Age: 39
End: 2024-09-25
Payer: COMMERCIAL

## 2024-09-25 RX ORDER — SODIUM CHLORIDE 0.9 % (FLUSH) 0.9 %
5-40 SYRINGE (ML) INJECTION PRN
Status: CANCELLED | OUTPATIENT
Start: 2024-09-25

## 2024-09-25 RX ORDER — SODIUM CHLORIDE 0.9 % (FLUSH) 0.9 %
5-40 SYRINGE (ML) INJECTION EVERY 12 HOURS SCHEDULED
Status: CANCELLED | OUTPATIENT
Start: 2024-09-25

## 2024-09-25 RX ORDER — SODIUM CHLORIDE 9 MG/ML
INJECTION, SOLUTION INTRAVENOUS PRN
Status: CANCELLED | OUTPATIENT
Start: 2024-09-25

## 2024-09-25 NOTE — H&P
Gynecology History and Physical    Name: Val Woody MRN: [id number] SSN: [id number]    YOB: 1985  Age: 39 y.o.  Sex: female       Subjective:      Chief complaint:  Missed     Val is a 39 y.o.  female with a history of miscarriage.     The current method of family planning is none.    OB History          3    Para   1    Term   1       0    AB   1    Living   1         SAB   1    IAB        Ectopic        Molar        Multiple   0    Live Births   1              No past medical history on file.  Past Surgical History:   Procedure Laterality Date     SECTION N/A 10/3/2022     SECTION performed by Mynor Foote [facility]     Social History     Occupational History    Not on file   Tobacco Use    Smoking status: Never    Smokeless tobacco: Never   Vaping Use    Vaping status: Never Used   Substance and Sexual Activity    Alcohol use: No    Drug use: Never    Sexual activity: Yes     Partners: Male     Birth control/protection: None     Family History   Problem Relation Age of Onset    Breast Cancer Other         PGGM    Hypertension Father     Asthma Father     Hypertension Maternal Grandmother         Allergies   Allergen Reactions    Codeine Nausea And Vomiting     Other reaction(s): Nausea and/or vomiting-Intolerance     Prior to Admission medications    Medication Sig Start Date End Date Taking? Authorizing Provider   Prenatal Vit w/Am-Fiagirbxz-CB (PNV PO) Take by mouth    Provider, MD Clare        Review of Systems:  A comprehensive review of systems was negative except for that written in the History of Present Illness.     Objective:     There were no vitals filed for this visit.    Physical Exam:  General:  Alert, cooperative, no distress.   Head:  Normocephalic, without obvious abnormality, atraumatic.   Eyes:  Conjunctivae/corneas clear. Extraocular movements intact.   Lungs:   Non-labored respirations.   Chest wall:  No

## 2024-09-26 ENCOUNTER — ANESTHESIA (OUTPATIENT)
Dept: SURGERY | Age: 39
End: 2024-09-26
Payer: COMMERCIAL

## 2024-09-26 ENCOUNTER — HOSPITAL ENCOUNTER (OUTPATIENT)
Age: 39
Setting detail: OUTPATIENT SURGERY
Discharge: HOME | End: 2024-09-26
Attending: STUDENT IN AN ORGANIZED HEALTH CARE EDUCATION/TRAINING PROGRAM | Admitting: STUDENT IN AN ORGANIZED HEALTH CARE EDUCATION/TRAINING PROGRAM
Payer: COMMERCIAL

## 2024-09-26 ENCOUNTER — PREP FOR PROCEDURE (OUTPATIENT)
Dept: OBGYN CLINIC | Age: 39
End: 2024-09-26

## 2024-09-26 VITALS
RESPIRATION RATE: 16 BRPM | WEIGHT: 168.1 LBS | DIASTOLIC BLOOD PRESSURE: 73 MMHG | HEART RATE: 92 BPM | TEMPERATURE: 97.2 F | BODY MASS INDEX: 30.93 KG/M2 | HEIGHT: 62 IN | SYSTOLIC BLOOD PRESSURE: 121 MMHG | OXYGEN SATURATION: 100 %

## 2024-09-26 DIAGNOSIS — O02.1 MISSED ABORTION: ICD-10-CM

## 2024-09-26 PROCEDURE — 3600000002 HC SURGERY LEVEL 2 BASE: Performed by: STUDENT IN AN ORGANIZED HEALTH CARE EDUCATION/TRAINING PROGRAM

## 2024-09-26 PROCEDURE — 2500000003 HC RX 250 WO HCPCS: Performed by: NURSE ANESTHETIST, CERTIFIED REGISTERED

## 2024-09-26 PROCEDURE — 7100000010 HC PHASE II RECOVERY - FIRST 15 MIN: Performed by: STUDENT IN AN ORGANIZED HEALTH CARE EDUCATION/TRAINING PROGRAM

## 2024-09-26 PROCEDURE — 59820 CARE OF MISCARRIAGE: CPT | Performed by: STUDENT IN AN ORGANIZED HEALTH CARE EDUCATION/TRAINING PROGRAM

## 2024-09-26 PROCEDURE — 6370000000 HC RX 637 (ALT 250 FOR IP): Performed by: STUDENT IN AN ORGANIZED HEALTH CARE EDUCATION/TRAINING PROGRAM

## 2024-09-26 PROCEDURE — 3700000001 HC ADD 15 MINUTES (ANESTHESIA): Performed by: STUDENT IN AN ORGANIZED HEALTH CARE EDUCATION/TRAINING PROGRAM

## 2024-09-26 PROCEDURE — 3600000012 HC SURGERY LEVEL 2 ADDTL 15MIN: Performed by: STUDENT IN AN ORGANIZED HEALTH CARE EDUCATION/TRAINING PROGRAM

## 2024-09-26 PROCEDURE — 7100000001 HC PACU RECOVERY - ADDTL 15 MIN: Performed by: STUDENT IN AN ORGANIZED HEALTH CARE EDUCATION/TRAINING PROGRAM

## 2024-09-26 PROCEDURE — 7100000000 HC PACU RECOVERY - FIRST 15 MIN: Performed by: STUDENT IN AN ORGANIZED HEALTH CARE EDUCATION/TRAINING PROGRAM

## 2024-09-26 PROCEDURE — 6360000002 HC RX W HCPCS: Performed by: SURGERY

## 2024-09-26 PROCEDURE — 3700000000 HC ANESTHESIA ATTENDED CARE: Performed by: STUDENT IN AN ORGANIZED HEALTH CARE EDUCATION/TRAINING PROGRAM

## 2024-09-26 PROCEDURE — 2500000003 HC RX 250 WO HCPCS: Performed by: STUDENT IN AN ORGANIZED HEALTH CARE EDUCATION/TRAINING PROGRAM

## 2024-09-26 PROCEDURE — 6360000002 HC RX W HCPCS: Performed by: NURSE ANESTHETIST, CERTIFIED REGISTERED

## 2024-09-26 PROCEDURE — 88305 TISSUE EXAM BY PATHOLOGIST: CPT

## 2024-09-26 PROCEDURE — 7100000011 HC PHASE II RECOVERY - ADDTL 15 MIN: Performed by: STUDENT IN AN ORGANIZED HEALTH CARE EDUCATION/TRAINING PROGRAM

## 2024-09-26 PROCEDURE — 2580000003 HC RX 258: Performed by: SURGERY

## 2024-09-26 PROCEDURE — 6370000000 HC RX 637 (ALT 250 FOR IP): Performed by: SURGERY

## 2024-09-26 PROCEDURE — 2709999900 HC NON-CHARGEABLE SUPPLY: Performed by: STUDENT IN AN ORGANIZED HEALTH CARE EDUCATION/TRAINING PROGRAM

## 2024-09-26 PROCEDURE — 2580000003 HC RX 258: Performed by: STUDENT IN AN ORGANIZED HEALTH CARE EDUCATION/TRAINING PROGRAM

## 2024-09-26 RX ORDER — FENTANYL CITRATE 50 UG/ML
INJECTION, SOLUTION INTRAMUSCULAR; INTRAVENOUS
Status: DISCONTINUED | OUTPATIENT
Start: 2024-09-26 | End: 2024-09-26 | Stop reason: SDUPTHER

## 2024-09-26 RX ORDER — LIDOCAINE HYDROCHLORIDE 10 MG/ML
1 INJECTION, SOLUTION INFILTRATION; PERINEURAL
Status: DISCONTINUED | OUTPATIENT
Start: 2024-09-26 | End: 2024-09-26 | Stop reason: HOSPADM

## 2024-09-26 RX ORDER — HALOPERIDOL 5 MG/ML
1 INJECTION INTRAMUSCULAR
Status: DISCONTINUED | OUTPATIENT
Start: 2024-09-26 | End: 2024-09-26 | Stop reason: HOSPADM

## 2024-09-26 RX ORDER — LIDOCAINE HYDROCHLORIDE 20 MG/ML
INJECTION, SOLUTION EPIDURAL; INFILTRATION; INTRACAUDAL; PERINEURAL
Status: DISCONTINUED | OUTPATIENT
Start: 2024-09-26 | End: 2024-09-26 | Stop reason: SDUPTHER

## 2024-09-26 RX ORDER — KETOROLAC TROMETHAMINE 30 MG/ML
INJECTION, SOLUTION INTRAMUSCULAR; INTRAVENOUS
Status: DISCONTINUED | OUTPATIENT
Start: 2024-09-26 | End: 2024-09-26 | Stop reason: SDUPTHER

## 2024-09-26 RX ORDER — LABETALOL HYDROCHLORIDE 5 MG/ML
10 INJECTION, SOLUTION INTRAVENOUS
Status: DISCONTINUED | OUTPATIENT
Start: 2024-09-26 | End: 2024-09-26 | Stop reason: HOSPADM

## 2024-09-26 RX ORDER — MIDAZOLAM HYDROCHLORIDE 1 MG/ML
INJECTION INTRAMUSCULAR; INTRAVENOUS
Status: DISCONTINUED | OUTPATIENT
Start: 2024-09-26 | End: 2024-09-26 | Stop reason: SDUPTHER

## 2024-09-26 RX ORDER — SODIUM CHLORIDE 9 MG/ML
INJECTION, SOLUTION INTRAVENOUS PRN
Status: DISCONTINUED | OUTPATIENT
Start: 2024-09-26 | End: 2024-09-26 | Stop reason: HOSPADM

## 2024-09-26 RX ORDER — PROCHLORPERAZINE EDISYLATE 5 MG/ML
5 INJECTION INTRAMUSCULAR; INTRAVENOUS
Status: DISCONTINUED | OUTPATIENT
Start: 2024-09-26 | End: 2024-09-26 | Stop reason: HOSPADM

## 2024-09-26 RX ORDER — SODIUM CHLORIDE 0.9 % (FLUSH) 0.9 %
5-40 SYRINGE (ML) INJECTION EVERY 12 HOURS SCHEDULED
Status: DISCONTINUED | OUTPATIENT
Start: 2024-09-26 | End: 2024-09-26 | Stop reason: HOSPADM

## 2024-09-26 RX ORDER — SODIUM CHLORIDE, SODIUM LACTATE, POTASSIUM CHLORIDE, CALCIUM CHLORIDE 600; 310; 30; 20 MG/100ML; MG/100ML; MG/100ML; MG/100ML
INJECTION, SOLUTION INTRAVENOUS CONTINUOUS
Status: DISCONTINUED | OUTPATIENT
Start: 2024-09-26 | End: 2024-09-26 | Stop reason: HOSPADM

## 2024-09-26 RX ORDER — SODIUM CHLORIDE 0.9 % (FLUSH) 0.9 %
5-40 SYRINGE (ML) INJECTION PRN
Status: DISCONTINUED | OUTPATIENT
Start: 2024-09-26 | End: 2024-09-26 | Stop reason: HOSPADM

## 2024-09-26 RX ORDER — SCOLOPAMINE TRANSDERMAL SYSTEM 1 MG/1
1 PATCH, EXTENDED RELEASE TRANSDERMAL
Status: DISCONTINUED | OUTPATIENT
Start: 2024-09-26 | End: 2024-09-26 | Stop reason: HOSPADM

## 2024-09-26 RX ORDER — IPRATROPIUM BROMIDE AND ALBUTEROL SULFATE 2.5; .5 MG/3ML; MG/3ML
1 SOLUTION RESPIRATORY (INHALATION)
Status: DISCONTINUED | OUTPATIENT
Start: 2024-09-26 | End: 2024-09-26 | Stop reason: HOSPADM

## 2024-09-26 RX ORDER — OXYCODONE HYDROCHLORIDE 5 MG/1
5 TABLET ORAL
Status: DISCONTINUED | OUTPATIENT
Start: 2024-09-26 | End: 2024-09-26 | Stop reason: HOSPADM

## 2024-09-26 RX ORDER — EPHEDRINE SULFATE/0.9% NACL/PF 50 MG/5 ML
SYRINGE (ML) INTRAVENOUS
Status: DISCONTINUED | OUTPATIENT
Start: 2024-09-26 | End: 2024-09-26 | Stop reason: SDUPTHER

## 2024-09-26 RX ORDER — NALOXONE HYDROCHLORIDE 0.4 MG/ML
INJECTION, SOLUTION INTRAMUSCULAR; INTRAVENOUS; SUBCUTANEOUS PRN
Status: DISCONTINUED | OUTPATIENT
Start: 2024-09-26 | End: 2024-09-26 | Stop reason: HOSPADM

## 2024-09-26 RX ORDER — PROPOFOL 10 MG/ML
INJECTION, EMULSION INTRAVENOUS
Status: DISCONTINUED | OUTPATIENT
Start: 2024-09-26 | End: 2024-09-26 | Stop reason: SDUPTHER

## 2024-09-26 RX ORDER — DEXAMETHASONE SODIUM PHOSPHATE 10 MG/ML
INJECTION INTRAMUSCULAR; INTRAVENOUS
Status: DISCONTINUED | OUTPATIENT
Start: 2024-09-26 | End: 2024-09-26 | Stop reason: SDUPTHER

## 2024-09-26 RX ORDER — ONDANSETRON 2 MG/ML
INJECTION INTRAMUSCULAR; INTRAVENOUS
Status: DISCONTINUED | OUTPATIENT
Start: 2024-09-26 | End: 2024-09-26 | Stop reason: SDUPTHER

## 2024-09-26 RX ADMIN — SODIUM CHLORIDE 150 MG: 9 INJECTION, SOLUTION INTRAVENOUS at 06:52

## 2024-09-26 RX ADMIN — PROPOFOL 150 MG: 10 INJECTION, EMULSION INTRAVENOUS at 07:07

## 2024-09-26 RX ADMIN — MIDAZOLAM 4 MG: 1 INJECTION INTRAMUSCULAR; INTRAVENOUS at 07:03

## 2024-09-26 RX ADMIN — FENTANYL CITRATE 50 MCG: 50 INJECTION, SOLUTION INTRAMUSCULAR; INTRAVENOUS at 07:15

## 2024-09-26 RX ADMIN — Medication 10 MG: at 07:30

## 2024-09-26 RX ADMIN — LIDOCAINE HYDROCHLORIDE 100 MG: 20 INJECTION, SOLUTION EPIDURAL; INFILTRATION; INTRACAUDAL; PERINEURAL at 07:07

## 2024-09-26 RX ADMIN — FENTANYL CITRATE 50 MCG: 50 INJECTION, SOLUTION INTRAMUSCULAR; INTRAVENOUS at 07:27

## 2024-09-26 RX ADMIN — DEXAMETHASONE SODIUM PHOSPHATE 10 MG: 10 INJECTION INTRAMUSCULAR; INTRAVENOUS at 07:15

## 2024-09-26 RX ADMIN — DOXYCYCLINE 100 MG: 100 INJECTION, POWDER, LYOPHILIZED, FOR SOLUTION INTRAVENOUS at 07:08

## 2024-09-26 RX ADMIN — KETOROLAC TROMETHAMINE 30 MG: 30 INJECTION, SOLUTION INTRAMUSCULAR; INTRAVENOUS at 07:33

## 2024-09-26 RX ADMIN — ONDANSETRON 4 MG: 2 INJECTION INTRAMUSCULAR; INTRAVENOUS at 07:15

## 2024-09-26 ASSESSMENT — PAIN - FUNCTIONAL ASSESSMENT: PAIN_FUNCTIONAL_ASSESSMENT: 0-10

## 2024-09-26 ASSESSMENT — PAIN SCALES - GENERAL
PAINLEVEL_OUTOF10: 0
PAINLEVEL_OUTOF10: 3

## 2024-10-08 NOTE — PROGRESS NOTES
Val presents for postop visit from DILATATION AND CURETTAGE SUCTION about 2 weeks ago.  Doing well postoperatively.without any bleeding.  Fever: No Voiding well: Yes.  Bowel movements OK: Yes.     Exam: A&OX3, NAD.  Abdomen:  Non tender    Discussed pathology report which was A: \" PRODUCTS OF CONCEPTION\": PRODUCTS OF CONCEPTION       A/P.  Stable Post op condition.  Gradually increase activity. Resumption of sexual activity is  encouraged at this time.  Follow up PRN.    Val Kenny MD  [facility]

## 2024-10-09 ENCOUNTER — OFFICE VISIT (OUTPATIENT)
Dept: OBGYN CLINIC | Age: 39
End: 2024-10-09

## 2024-10-09 VITALS
BODY MASS INDEX: 31.01 KG/M2 | WEIGHT: 168.5 LBS | SYSTOLIC BLOOD PRESSURE: 118 MMHG | DIASTOLIC BLOOD PRESSURE: 68 MMHG | HEIGHT: 62 IN

## 2024-10-09 DIAGNOSIS — Z98.890 POST-OPERATIVE STATE: Primary | ICD-10-CM

## 2024-10-09 PROCEDURE — 99024 POSTOP FOLLOW-UP VISIT: CPT | Performed by: STUDENT IN AN ORGANIZED HEALTH CARE EDUCATION/TRAINING PROGRAM

## 2024-12-02 ENCOUNTER — PATIENT MESSAGE (OUTPATIENT)
Dept: OBGYN CLINIC | Age: 39
End: 2024-12-02

## 2024-12-02 DIAGNOSIS — Z32.01 POSITIVE PREGNANCY TEST: ICD-10-CM

## 2024-12-02 DIAGNOSIS — Z87.59 HISTORY OF MISCARRIAGE: Primary | ICD-10-CM

## 2024-12-02 NOTE — TELEPHONE ENCOUNTER
Orders Placed This Encounter   Procedures    HCG, Quantitative, Pregnancy     Standing Status:   Future     Standing Expiration Date:   12/3/2024    HCG, Quantitative, Pregnancy     Standing Status:   Future     Standing Expiration Date:   12/5/2024

## 2024-12-03 ENCOUNTER — LAB (OUTPATIENT)
Dept: OBGYN CLINIC | Age: 39
End: 2024-12-03

## 2024-12-03 DIAGNOSIS — Z32.01 POSITIVE PREGNANCY TEST: ICD-10-CM

## 2024-12-03 DIAGNOSIS — Z87.59 HISTORY OF MISCARRIAGE: ICD-10-CM

## 2024-12-03 LAB — HCG SERPL-ACNC: 24 MIU/ML

## 2024-12-05 ENCOUNTER — LAB (OUTPATIENT)
Dept: OBGYN CLINIC | Age: 39
End: 2024-12-05

## 2024-12-05 DIAGNOSIS — Z87.59 HISTORY OF MISCARRIAGE: ICD-10-CM

## 2024-12-05 DIAGNOSIS — Z32.01 POSITIVE PREGNANCY TEST: ICD-10-CM

## 2024-12-05 LAB — HCG SERPL-ACNC: 5 MIU/ML

## 2024-12-12 ENCOUNTER — LAB (OUTPATIENT)
Dept: OBGYN CLINIC | Age: 39
End: 2024-12-12

## 2024-12-12 DIAGNOSIS — Z32.01 POSITIVE PREGNANCY TEST: ICD-10-CM

## 2024-12-12 DIAGNOSIS — Z87.59 HISTORY OF MISCARRIAGE: ICD-10-CM

## 2024-12-12 LAB — HCG SERPL-ACNC: 1 MIU/ML

## 2025-06-23 NOTE — PROGRESS NOTES
The patient is a 39 y.o.  who is seen to discuss her new pregnancy. Pt denies any current unilateral pain, cramping, urinary symptoms, or vaginal bleeding. She is currently taking an over the counter PNV with DHA and folic acid. 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     LMP: 2025   JOSELIN based off of LMP: 2026   GA today: 11w2d    Ultrasound findings from today:  EOB from PREG for dates.   Single IUP + FCA. FHT =171 BPM   S=D   Dates with LMP=11w2d , Dates with US= 12w0d   JOSELIN by LMP = 26   Large 4.1cm probable cystic hygroma noted. This extends the entire length of the fetus.   Bilateral adnexal regions appear WNL---CL on the left   Cx appears >3cm   Posterior placenta.     HISTORY:    Patient's last menstrual period was 2025 (exact date).  Sexual History:  has sex with males  Contraception:  none  No current outpatient medications on file prior to visit.     No current facility-administered medications on file prior to visit.       ROS:  Feeling well. No dyspnea or chest pain on exertion.  No abdominal pain, change in bowel habits, black or bloody stools.  No urinary tract symptoms. GYN ROS: see above.    PHYSICAL EXAM:  Blood pressure 138/82, height 1.575 m (5' 2\"), weight 76.6 kg (168 lb 14.4 oz), last menstrual period 2025, not currently breastfeeding.    The patient appears well, alert, oriented x 3, in no distress.    ASSESSMENT:  Encounter Diagnoses   Name Primary?    Pregnancy test positive Yes    Missed periods     Advanced maternal age in multigravida, first trimester     11 weeks gestation of pregnancy     Cystic hygroma of fetus in rankin pregnancy        PLAN:  All questions answered  Diagnosis explained in detail, including differential  US images and findings reviewed with pt.   Reassured of IUP with + XET=[id number]  Discussed imaging appears c/w with large cystic hygroma and posterior portion of josafat does not appeared to have ossified   Discussed with

## 2025-06-24 ENCOUNTER — PROCEDURE VISIT (OUTPATIENT)
Dept: OBGYN CLINIC | Age: 40
End: 2025-06-24
Payer: COMMERCIAL

## 2025-06-24 ENCOUNTER — OFFICE VISIT (OUTPATIENT)
Dept: OBGYN CLINIC | Age: 40
End: 2025-06-24
Payer: COMMERCIAL

## 2025-06-24 ENCOUNTER — RESULTS FOLLOW-UP (OUTPATIENT)
Dept: OBGYN CLINIC | Age: 40
End: 2025-06-24

## 2025-06-24 VITALS
SYSTOLIC BLOOD PRESSURE: 138 MMHG | BODY MASS INDEX: 31.08 KG/M2 | DIASTOLIC BLOOD PRESSURE: 82 MMHG | WEIGHT: 168.9 LBS | HEIGHT: 62 IN

## 2025-06-24 DIAGNOSIS — O35.8XX0 CYSTIC HYGROMA OF FETUS IN SINGLETON PREGNANCY: Primary | ICD-10-CM

## 2025-06-24 DIAGNOSIS — Z32.01 PREGNANCY TEST POSITIVE: Primary | ICD-10-CM

## 2025-06-24 DIAGNOSIS — Z3A.09 9 WEEKS GESTATION OF PREGNANCY: ICD-10-CM

## 2025-06-24 DIAGNOSIS — N92.6 MISSED MENSES: Primary | ICD-10-CM

## 2025-06-24 DIAGNOSIS — O35.8XX0 CYSTIC HYGROMA OF FETUS IN SINGLETON PREGNANCY: ICD-10-CM

## 2025-06-24 DIAGNOSIS — R93.89 ABNORMAL FINDING ON ULTRASOUND: ICD-10-CM

## 2025-06-24 DIAGNOSIS — N92.6 MISSED PERIODS: ICD-10-CM

## 2025-06-24 DIAGNOSIS — O09.521 ADVANCED MATERNAL AGE IN MULTIGRAVIDA, FIRST TRIMESTER: ICD-10-CM

## 2025-06-24 DIAGNOSIS — Z3A.11 11 WEEKS GESTATION OF PREGNANCY: ICD-10-CM

## 2025-06-24 DIAGNOSIS — Z34.81 PRENATAL CARE, SUBSEQUENT PREGNANCY, FIRST TRIMESTER: ICD-10-CM

## 2025-06-24 PROBLEM — O02.1 MISSED ABORTION: Status: RESOLVED | Noted: 2024-09-26 | Resolved: 2025-06-24

## 2025-06-24 LAB
HCG, PREGNANCY, URINE, POC: POSITIVE
VALID INTERNAL CONTROL, POC: YES

## 2025-06-24 PROCEDURE — 99214 OFFICE O/P EST MOD 30 MIN: CPT | Performed by: NURSE PRACTITIONER

## 2025-06-24 PROCEDURE — 76817 TRANSVAGINAL US OBSTETRIC: CPT | Performed by: OBSTETRICS & GYNECOLOGY

## 2025-06-24 PROCEDURE — 81025 URINE PREGNANCY TEST: CPT | Performed by: NURSE PRACTITIONER

## 2025-06-24 NOTE — ADDENDUM NOTE
AQUATIC PHYSICAL THERAPY EXERCISE LOG (TRUNK)  Date    Danielle Ocampo PT, DPT  10/30/17 Danielle Ocampo PT,  DPT  11/2/17 Danielle Ocampo PT,  DPT  11/6/17 Danielle Ocampo PT,  DPT  11/13/17   Warm Up Ambulation (Forward/Side/Back/March/All) x4 laps forward, x2 side and x2 backwards with hands on noodle for support, facil at hips for posture/limit posterior lean and cues for larger step length x2 laps forward, x2 side and x2 backwards with hands on noodle for support, facil at hips for posture and cues for larger step length x2 laps forward, x2 side and x2 backwards with hands on noodle for support, facil at hips for posture and cues for larger step length x2 laps forward, x2 side and x2 backwards with hands on noodle for support, facil at hips for posture and cues for larger step length         UE support from railing and therapist to enter/exit pool UE support from railing and therapist to enter/exit pool - improved step length and speed leaving session UE support from railing to enter/exit, therapist close-distant supervision UE support from railing to enter/exit, therapist close-distant supervision                   Stretching/ROM Chin Tucks               CROM (Flex/Ext/SB/Rot/All)               Shoulder (Shrugs/Rolls)               Scapular (Retraction/Depression)               Upper Trunk (Levator/Scalene/Upper Trapezius)               Pec Stretch (Unilateral/Bilateral)               Posterior Shoulder               Gluteal    x30 sec ea LE x30 sec ea LE x30 sec ea LE      Hamstring (On Step/Cuff)    x30 sec ea LE therapist assist horx abd/add and noodle support x30 sec ea LE therapist assist horx abd/add and noodle support x30 sec ea LE therapist assist horx abd/add and noodle support      Calf (In Hole/At Wall)    x60 sec ea LE, mod-max cues for technique  x60 sec ea LE, mod-max cues for technique  x60 sec ea LE, mod-max cues for technique       Quad               Hip Flexor (Kneel)               Piriformis  Addended by: LOUIE ANGULO on: 6/24/2025 02:04 PM     Modules accepted: Orders     (Seated/Stand)               Trunk ROM (Flex/Ext/SB/Rot/All)    R/L trunk SB x30 sec ea   R/L trunk SB x30 sec ea      BAD RAGAZ                                              Strengthening Abdominal Sets/ Pelvic Tilt               Shoulder (Flex/Ext, Abd/Add, IR/ER, Circles, Alternation flex/ext for core) x10-15 ea UE only then progressed with open paddles for greater resistance, completed in wall sit for core activation x10 reps ea UE only in standing for balance challenge   x10 reps ea UE only in standing for balance challenge      Horizon (Abd/Add, Diagonals) (as above)  (as above)    (as above)      Rowing Arms               UE PNF (D1/D2)               Abdominal Sets (Push/Pull, side to side, push down with board)               Squat    B UE support from pool wall x10 reps B UE support from pool wall x10 reps B UE support from pool wall x10 reps      Lunge Squat               Hip (Flex/Ext, Abd/Add, single leg bike, circles, figure 8, All) x10 ea 1-2 UE support from wall for balance/posture (no figure 8) x10 ea with B UE support from wall in corner (no figure 8) x10 ea with B UE support from wall in corner (no figure 8) x10 ea with B UE support from wall in corner (no figure 8)      Heel/Toe Raises               Step Ups (Forward, Lateral)               Noodle Push Downs with UE(B UE/1 UE) for core strengthening               Noodle Push Downs with LE    x10  ea LE back support against wall, lightweight noodle, assist with set-up x10  ea LE back support against wall, progressed noodle resistance, assist with set-up x10  ea LE back support against wall, progressed noodle resistance, assist with set-up      Stir the Pot/Punches with Dumbells               Sit to Stand at Bench h8-10x total, 3x sucessfull without retropulsion, needs support from therapist for controled desent x8 reps 1x retropulsion, significantly improved control, continued cues for technique  x8 reps without LOB, continued cues for technique x8  reps without LOB, continued cues for technique, use of paddleboard R UE support to limit tendency for L sided trunk lean      Prone Plank on step               Side Plank on step                                              Aerobic Fast Walking               Bike/Ski/Juan/All                                              Balance Narrow Base of Support x10-30 sec feet hips width, progressed to romberg x30 sec without LOB            Tandem Stand               Single Leg Stance               Heel/Toe Walking               3 Step and Stop               Braiding                                                               Cool Down Ambulation               Deep Water Dangle               Whirlpool

## 2025-06-25 ENCOUNTER — ROUTINE PRENATAL (OUTPATIENT)
Dept: OBGYN CLINIC | Age: 40
End: 2025-06-25
Payer: COMMERCIAL

## 2025-06-25 ENCOUNTER — TELEPHONE (OUTPATIENT)
Dept: OBGYN CLINIC | Age: 40
End: 2025-06-25

## 2025-06-25 VITALS — DIASTOLIC BLOOD PRESSURE: 84 MMHG | SYSTOLIC BLOOD PRESSURE: 120 MMHG

## 2025-06-25 DIAGNOSIS — O99.211 OBESITY AFFECTING PREGNANCY IN FIRST TRIMESTER, UNSPECIFIED OBESITY TYPE: ICD-10-CM

## 2025-06-25 DIAGNOSIS — Z14.8 GENETIC CARRIER: ICD-10-CM

## 2025-06-25 DIAGNOSIS — Z3A.11 11 WEEKS GESTATION OF PREGNANCY: ICD-10-CM

## 2025-06-25 DIAGNOSIS — O35.8XX0 CYSTIC HYGROMA OF FETUS IN SINGLETON PREGNANCY: ICD-10-CM

## 2025-06-25 DIAGNOSIS — N96 HISTORY OF RECURRENT MISCARRIAGES: ICD-10-CM

## 2025-06-25 DIAGNOSIS — Z98.891 HISTORY OF C-SECTION: ICD-10-CM

## 2025-06-25 DIAGNOSIS — O09.521 MULTIGRAVIDA OF ADVANCED MATERNAL AGE IN FIRST TRIMESTER: Primary | ICD-10-CM

## 2025-06-25 PROCEDURE — 99205 OFFICE O/P NEW HI 60 MIN: CPT | Performed by: OBSTETRICS & GYNECOLOGY

## 2025-06-25 PROCEDURE — 76801 OB US < 14 WKS SINGLE FETUS: CPT | Performed by: OBSTETRICS & GYNECOLOGY

## 2025-06-25 PROCEDURE — 76813 OB US NUCHAL MEAS 1 GEST: CPT | Performed by: OBSTETRICS & GYNECOLOGY

## 2025-06-25 RX ORDER — LEVOTHYROXINE SODIUM 25 UG/1
25 TABLET ORAL DAILY
COMMUNITY

## 2025-06-25 NOTE — TELEPHONE ENCOUNTER
Email sent to [facility] for Jamey to cancel test order for Carrier Screening.

## 2025-06-25 NOTE — PROGRESS NOTES
[facility] MATERNAL FETAL MEDICINE    [address] [place] [address]  P- [phone]  [phone]     MFM Consultation  Presents for evaluation of the following chief complaint(s):   Chief Complaint   Patient presents with    High Risk Pregnancy     AMA, BMI > 30, Recurrent miscarriage, Suspected fetal anomaly        Val Woody (1985) is a 39 y.o.  at 11w3d with 2026, Date entered prior to episode creation.     Patient is working as a , out for summer break.  Support person, Ranjith (), present today.   Personal and family history reviewed and updated as indicated.   Records from pregnancy reviewed. Chart updated to portray current issues.   Pt is not scheduled to see Primary OB ([facility]).    No HAs, edema. No bleeding, LOF, cramping, ctxs, or vaginal pressure.        RPL with 3 SAB in past calendar year. 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 Ranjith lost insurance and has not been checked. No parental karyotypes yet. Mary states she does have diminished ovarian reserve.    Desire parental karyotypes once Ranjith's insurance kicks in . We will order through our office.     Unfortunately, CH with thoracic anasarca confirmed today.    NIPT pending.    Weekly eval with MFM for next 2 weeks.    Considering TOP if does not have loss. Reviewed locations.     Mood evaluated today based on discussion with pt and PHQ screen. Mood concerns addressed as needed.    Mood Reassuring today  Recommend lifestyle/behavioral modifications to enhance mood (exercise, sunshine, mood apps, nutritional modifications, etc).     Reviewed gestational age precautions and activity goals/limitations; addressed normal pregnancy complaints, reassured and offered suggestions for care.  Nutritional counseling as well as specific goals based on current maternal and fetal status; options for GERD, constipation, other common complaints reviewed  Reviewed gestational

## 2025-06-25 NOTE — ASSESSMENT & PLAN NOTE
Preconception BMI >= 30 increases risk for pregnancy complications, including gestational diabetes, poor or accelerated fetal growth, hypertensive disorders of pregnancy, and abnormal labor progression. In addition, there is an increased risk of fetal demise, as well as congenital anomalies including neural tube defects, cardiac malformations, orofacial defects, and limb reduction abnormalities.     The risk for stillbirth increases with increasing obesity: class I obesity 1.3 [1.2-1.4], class II obesity 1.4 [1.3-1.6], class III obesity 1.9 [1.3-1.6]) and higher stillbirth risk in  obese women (1.9 [1.7-2.1]) than in  obese women (1.4 [1.3-1.5]). Among women with class III obesity (BMI >=40 kg/m2), the risk for stillbirth increased with advancing gestational age: 30 to 33 weeks, hazard and risk ratios 1.40 and 1.69, respectively; 37 to 39 weeks, hazard and risk ratios 3.20 and 2.95, respectively; and 40 to 42 weeks, hazard and risk ratios 3.30 and 8.95, respectively.           Recommend detailed first trimester ultrasound with NT at 12-13 weeks.   Recommend level II ultrasound for anatomy and fetal echo if prepregnancy BMI >30-35 based on AIUM guidelines.     Early evaluation of insulin resistance with HgbA1c at initiation of care- if a1c > 5.5, then follow up with either \"2 step\" 1hr GCT/ 3hr GTT OR \"1 step\" 2hr GTT  Closely monitor blood pressure for development/worsening of hypertensive disorders of pregnancy.    Weight Gain Goal: <15 pounds, it is ok to stay same weight or lose as long as baby growing well  Dietary choices- low carb fine, avoid extreme keto (goal >50-75gm carb/day); not to use intermittent/prolonged fasting without specific discussion with physician.     Continue activity/exercise     The American College of Obstetricians and Gynecologists (ACOG) recommends weekly  surveillance for fetal well-being, starting by 34 weeks and 0 days of gestation for women with a 
Advanced Maternal Age (Maternal Age 35 or greater at JOSELIN)    Although there is no standardized definition of AMA, one common definition is of maternal age above 35. While maternal age above 40 is considered very advanced maternal age (VAMA), and above 45, very late maternal age\extremely advanced maternal age (EAMA).    First and Second Trimester  The risk of fetal aneuploidy based on maternal age should be reviewed with patient either with physicians or genetic counselor, or both. Testing for fetal aneuploidy can be divided into invasive and non-invasive tests.   Invasive testing, which includes amniocentesis and chorionic villus sampling, is diagnostic.   Non-invasive testing of maternal blood (for either hormone levels or cell-free fetal DNA), with or without ultrasound examination, is a screening test and requires follow-up testing of patients with screen-positive results.  Given the increased risk of congenital anomalies in older women, a detailed second trimester ultrasound examination to assess for significant structural anomalies (particularly cardiac defects) is recommended.      Genetic counseling was performed by physician after reviewing patient's genetic history.  The patient's Down syndrome age associated risk, as well as, risks of additional aneuploidy and genetic syndromes, are reduced by approximately 50% with normal anatomy ultrasounds in 1st/2nd trimester. Ultrasound alone does not rule out all abnormalities of genetics and development.     Maternal serum screening for aneuploidy was discussed with the patient including first trimester non-invasive prenatal testing (NIPT) for aneuploidy from a maternal blood sample.  Positive predictive and negative predictive values for these tests were explained, questions answered. Patient understands that these are screening tests that only assesses risk for select abnormalities (trisomies 13, 18, and 21, and sex chromosome abnormalities (NIPT). Assessment of 
common), distended jugular lymphatic sacs/cystic hygroma, ascites, and generalized hydrops. Additional presenting features include renal anomalies such as hydronephrosis, hypertrophic cardiomyopathy (HCM), and congenital heart disease, although this is less commonly identified prenatally because the defects are frequently valvular or septal and hence not readily identifiable in utero. Pulmonary stenosis and HCM may be progressive.The severity of NS varies widely, from presentations that are lethal prenatally to mildly affected individuals with a normal lifespan and minimal morbidity. Despite frequent developmental difficulties, the majority of individuals with NS are able to attend mainstream school and function within the normal range as adults    In normal fetuses, increased nuchal translucency commonly resolves spontaneously by the second trimester. A large or persistent nuchal fluid accumulation appears to be a poor prognostic factor, even in euploid nonanomalous fetuses.

## 2025-06-25 NOTE — PATIENT INSTRUCTIONS
Miscarriage and Recurrent Pregnancy Loss Resources    Share [facility] - [email or web address]  Moms Impactt through [facility] - [email or web address]    Postpartum Support International- https://postpartum.net/get-help/loss-grief-in-pregnancy-postpartum/  Resolve Recurrent Pregnancy Loss Support-  https://resolve.org/support-groups_topics/pregnancy-loss/  HOPE Project Resources- https://www.pregnancylossanswers.org/resources    Support Recourses following Pregnancy Interruption/Termination for Medical Reasons.     Books  \"Peace after \" by Reba Mullins  \"You're the only one I've Told: The Stories Behind \" by Nancy Scott    Websites/Groups  All-options.org/find-support/  Catholicsforchoice.org  Endingawantedpregnancy.com  Exhaleprovoice.org  Faithaloud.org  Postpartum.net/get-help/psi-online-support/  RCRC.org  https://emptyarmsbereavement.org/support-groups/      Resources for Depression/Anxiety  Postpartum Support International (PSI).    PSI Warmline:  1-064-896-4PPD (2779).  WWW.POSTPARTUM.NET    Mom's IMPACTT  [email or web address]-[email or web address]      Suicide & Crisis Lifeline  Dial 988    National Pregnancy Registry for Psychiatric Medications  National Pregnancy Registry for Psychiatric Medications  - [facility] (womensmentalhealth.org)     FindTreatment  Confidential and anonymous resource for persons seeking treatment for mental and substance use disorders in the [place] and its territories - Home - FindTreatment.gov

## 2025-06-25 NOTE — TELEPHONE ENCOUNTER
----- Message from Dr. Susan Andersen MD sent at 6/25/2025  7:47 AM EDT -----  Can you try to cancel her carrier screen since she has already done the huge one with [facility].     TY

## 2025-06-27 LAB
Lab: NORMAL

## 2025-06-29 SDOH — ECONOMIC STABILITY: FOOD INSECURITY: WITHIN THE PAST 12 MONTHS, THE FOOD YOU BOUGHT JUST DIDN'T LAST AND YOU DIDN'T HAVE MONEY TO GET MORE.: NEVER TRUE

## 2025-06-29 SDOH — ECONOMIC STABILITY: INCOME INSECURITY: IN THE LAST 12 MONTHS, WAS THERE A TIME WHEN YOU WERE NOT ABLE TO PAY THE MORTGAGE OR RENT ON TIME?: NO

## 2025-06-29 SDOH — ECONOMIC STABILITY: FOOD INSECURITY: WITHIN THE PAST 12 MONTHS, YOU WORRIED THAT YOUR FOOD WOULD RUN OUT BEFORE YOU GOT MONEY TO BUY MORE.: NEVER TRUE

## 2025-06-29 SDOH — ECONOMIC STABILITY: TRANSPORTATION INSECURITY
IN THE PAST 12 MONTHS, HAS THE LACK OF TRANSPORTATION KEPT YOU FROM MEDICAL APPOINTMENTS OR FROM GETTING MEDICATIONS?: NO

## 2025-06-30 ENCOUNTER — RESULTS FOLLOW-UP (OUTPATIENT)
Dept: OBGYN CLINIC | Age: 40
End: 2025-06-30

## 2025-06-30 LAB
Lab: ABNORMAL
NTRA 22Q11.2 DELETION SYNDROME POPULATION-BASED RISK TEXT: ABNORMAL
NTRA 22Q11.2 DELETION SYNDROME RESULT TEXT: ABNORMAL
NTRA 22Q11.2 DELETION SYNDROME RISK SCORE TEXT: ABNORMAL
NTRA FETAL FRACTION: ABNORMAL
NTRA GENDER OF FETUS: ABNORMAL
NTRA MONOSOMY X AGE-BASED RISK TEXT: ABNORMAL
NTRA MONOSOMY X RESULT TEXT: ABNORMAL
NTRA MONOSOMY X RISK SCORE TEXT: ABNORMAL
NTRA TRIPLOIDY RESULT TEXT: ABNORMAL
NTRA TRISOMY 13 AGE-BASED RISK TEXT: ABNORMAL
NTRA TRISOMY 13 RESULT TEXT: ABNORMAL
NTRA TRISOMY 13 RISK SCORE TEXT: ABNORMAL
NTRA TRISOMY 18 AGE-BASED RISK TEXT: ABNORMAL
NTRA TRISOMY 18 RESULT TEXT: ABNORMAL
NTRA TRISOMY 18 RISK SCORE TEXT: ABNORMAL
NTRA TRISOMY 21 AGE-BASED RISK TEXT: ABNORMAL
NTRA TRISOMY 21 RESULT TEXT: ABNORMAL
NTRA TRISOMY 21 RISK SCORE TEXT: ABNORMAL

## 2025-07-02 ENCOUNTER — ROUTINE PRENATAL (OUTPATIENT)
Dept: OBGYN CLINIC | Age: 40
End: 2025-07-02

## 2025-07-02 VITALS — DIASTOLIC BLOOD PRESSURE: 84 MMHG | SYSTOLIC BLOOD PRESSURE: 128 MMHG

## 2025-07-02 DIAGNOSIS — O99.211 OBESITY AFFECTING PREGNANCY IN FIRST TRIMESTER, UNSPECIFIED OBESITY TYPE: ICD-10-CM

## 2025-07-02 DIAGNOSIS — O09.521 MULTIGRAVIDA OF ADVANCED MATERNAL AGE IN FIRST TRIMESTER: ICD-10-CM

## 2025-07-02 DIAGNOSIS — Z98.891 HISTORY OF C-SECTION: ICD-10-CM

## 2025-07-02 DIAGNOSIS — Z3A.12 12 WEEKS GESTATION OF PREGNANCY: ICD-10-CM

## 2025-07-02 DIAGNOSIS — O28.5 ABNORMAL GENETIC TEST DURING PREGNANCY: Primary | ICD-10-CM

## 2025-07-02 DIAGNOSIS — N96 HISTORY OF RECURRENT MISCARRIAGES: ICD-10-CM

## 2025-07-02 DIAGNOSIS — O35.8XX0 CYSTIC HYGROMA OF FETUS IN SINGLETON PREGNANCY: ICD-10-CM

## 2025-07-02 ASSESSMENT — PATIENT HEALTH QUESTIONNAIRE - PHQ9
2. FEELING DOWN, DEPRESSED OR HOPELESS: NOT AT ALL
1. LITTLE INTEREST OR PLEASURE IN DOING THINGS: NOT AT ALL
SUM OF ALL RESPONSES TO PHQ QUESTIONS 1-9: 0

## 2025-07-02 NOTE — ASSESSMENT & PLAN NOTE
Plans TOP in [facility]    Will return to [facility] in 1 week for viability if has not been scheduled yet.

## 2025-07-02 NOTE — PATIENT INSTRUCTIONS
Resources for Depression/Anxiety  Postpartum Support International (PSI).    PSI Warmline:  6-984-499-4PPD (9594).  WWW.POSTPARTUM.NET    Mom's IMPACTT  [email or web address]      Suicide & Crisis Lifeline  Dial 988    National Pregnancy Registry for Psychiatric Medications  National Pregnancy Registry for Psychiatric Medications  - [facility] ([facility].org)     FindTreatment  Confidential and anonymous resource for persons seeking treatment for mental and substance use disorders in the [place] and its territories - Home - FindTreatment.gov

## 2025-07-02 NOTE — PROGRESS NOTES
[facility] MATERNAL FETAL MEDICINE    [address] [place] [address]  P- [phone]  D-[phone]     M Follow-up Visit  Val Woody (1985) is a 40 y.o.  at 12w3d with 2026, Date entered prior to episode creation.     Presents for evaluation of the following chief complaint(s):   Chief Complaint   Patient presents with    High Risk Pregnancy     Abnormal NIPT, AMA, BMI > 30, Cystic hygroma of fetus, Recurrent miscarriage    Pregnancy Ultrasound     viability     Patient is working as a , out for summer break.   Patient is not scheduled to see Primary OB ([facility]). Will plan 2wk s/p TOP check up.   Support person, Ranjith, present today.     Interval history since prior appt reviewed and chart updated as indicated.    No HAs, edema. No bleeding, LOF, cramping, ctxs, or vaginal pressure.        Mood evaluated today based on discussion with pt and PHQ screen.   Mood Reassuring today  Recommend lifestyle/behavioral modifications to enhance mood (exercise, sunshine, mood apps, nutritional modifications, etc).     Reviewed gestational age precautions and activity goals/limitations; addressed normal pregnancy complaints, reassured and offered suggestions for care  Nutritional counseling as well as specific goals based on current maternal and fetal status; options for GERD, constipation, other common complaints reviewed  Reviewed gestational age appropriate preventive care regarding communicable disease transmission and vaccines as appropriate (including flu, TDaP >28wk, RSV 32-36wk (-), as well as, COVID.)  All questions answered and concerns discussed. Additional recommendations in problem list.     Exam:     Vitals:    25 0804   BP: 128/84      Applicable labs reviewed.   Appropriate examination performed as documented.    Please see formal ultrasound report under imaging tab.      ASSESSMENT/PLAN:    Patient Active Problem List

## 2025-07-24 ENCOUNTER — OFFICE VISIT (OUTPATIENT)
Dept: OBGYN CLINIC | Age: 40
End: 2025-07-24
Payer: COMMERCIAL

## 2025-07-24 VITALS — DIASTOLIC BLOOD PRESSURE: 80 MMHG | SYSTOLIC BLOOD PRESSURE: 136 MMHG | BODY MASS INDEX: 30.6 KG/M2 | WEIGHT: 167.3 LBS

## 2025-07-24 DIAGNOSIS — D18.1 CYSTIC HYGROMA: Primary | ICD-10-CM

## 2025-07-24 DIAGNOSIS — Q90.9 TRISOMY 21: ICD-10-CM

## 2025-07-24 PROBLEM — Z98.891 HISTORY OF C-SECTION: Status: RESOLVED | Noted: 2024-09-24 | Resolved: 2025-07-24

## 2025-07-24 PROBLEM — O35.8XX0 CYSTIC HYGROMA OF FETUS IN SINGLETON PREGNANCY: Status: RESOLVED | Noted: 2025-06-24 | Resolved: 2025-07-01

## 2025-07-24 PROBLEM — O09.529 AMA (ADVANCED MATERNAL AGE) MULTIGRAVIDA 35+: Status: RESOLVED | Noted: 2022-03-21 | Resolved: 2025-07-24

## 2025-07-24 PROBLEM — O99.211 OBESITY AFFECTING PREGNANCY IN FIRST TRIMESTER: Status: RESOLVED | Noted: 2025-06-25 | Resolved: 2025-07-24

## 2025-07-24 PROCEDURE — 99214 OFFICE O/P EST MOD 30 MIN: CPT | Performed by: NURSE PRACTITIONER

## 2025-07-24 NOTE — PROGRESS NOTES
The patient is a 40 y.o.  who is seen for follow up EAB.  Pt was seen in our office on 25 to confirm her pregnancy.US was c/w cystic hygroma. Pt was referred to [facility] and offered genetic testing. Genetic testing c/w trisomy 21 and follow up US at [facility] suggestive of limb malformations. Notes the thought of baby having such a poor quality of life, or even the thought of IUFD/stillbirth was unbearable so elected for EAB in [facility] 7/10/25. Pt is having a difficult time processing things emotionally. Denies s/h/I. Notes she has since followed up with [facility], who recommended IUI/IVF, but pt notes would honestly prefer to start with more conservative measure for conception once she and  are ready. Notes bleeding to have resolved. Just spotting.     Ultrasound findings from today:  EOB from [facility] for dates.   Single IUP + FCA. FHT =171 BPM   S=D   Dates with LMP=11w2d , Dates with US= 12w0d   JOSELIN by LMP = 26   Large 4.1cm probable cystic hygroma noted. This extends the entire length of the fetus.   Bilateral adnexal regions appear WNL---CL on the left   Cx appears >3cm   Posterior placenta.     Pt states today she is spotting/ only when she wipes.         HISTORY:    Patient's last menstrual period was 2025 (exact date).    Current Outpatient Medications on File Prior to Visit   Medication Sig Dispense Refill    Cholecalciferol (VITAMIN D-3 PO) Take by mouth      BABY ASPIRIN PO Take by mouth      Prenatal Vit-Fe Fumarate-FA (PRENATAL PO) Take by mouth      Coenzyme Q10 (COQ10 PO) Take by mouth      levothyroxine (SYNTHROID) 25 MCG tablet Take 1 tablet by mouth Daily      Progesterone 200 MG SUPP Place vaginally (Patient not taking: Reported on 2025)       No current facility-administered medications on file prior to visit.       ROS:  Feeling well. No dyspnea or chest pain on exertion.  No abdominal pain, change in bowel habits, black or bloody stools.  No urinary tract symptoms. GYN

## 2025-08-04 ENCOUNTER — CLINICAL DOCUMENTATION (OUTPATIENT)
Dept: OBGYN CLINIC | Age: 40
End: 2025-08-04

## 2025-08-08 ENCOUNTER — OFFICE VISIT (OUTPATIENT)
Dept: OBGYN CLINIC | Age: 40
End: 2025-08-08
Payer: COMMERCIAL

## 2025-08-08 VITALS
BODY MASS INDEX: 31.1 KG/M2 | SYSTOLIC BLOOD PRESSURE: 120 MMHG | HEIGHT: 62 IN | DIASTOLIC BLOOD PRESSURE: 76 MMHG | WEIGHT: 169 LBS

## 2025-08-08 DIAGNOSIS — E03.9 HYPOTHYROIDISM, UNSPECIFIED TYPE: Primary | ICD-10-CM

## 2025-08-08 DIAGNOSIS — R45.89 DEPRESSED MOOD: ICD-10-CM

## 2025-08-08 DIAGNOSIS — E03.9 HYPOTHYROIDISM, UNSPECIFIED TYPE: ICD-10-CM

## 2025-08-08 LAB
T4 FREE SERPL-MCNC: 1.2 NG/DL (ref 0.9–1.7)
TSH, 3RD GENERATION: 1.07 UIU/ML (ref 0.27–4.2)

## 2025-08-08 PROCEDURE — 99214 OFFICE O/P EST MOD 30 MIN: CPT | Performed by: NURSE PRACTITIONER

## 2025-08-11 RX ORDER — LEVOTHYROXINE SODIUM 25 UG/1
25 TABLET ORAL DAILY
Qty: 30 TABLET | Refills: 5 | Status: SHIPPED | OUTPATIENT
Start: 2025-08-11

## (undated) DEVICE — TRAY CATH 16FR PVC INTMIT STR TIP PREATTACH TO 1000ML COLL

## (undated) DEVICE — APPLICATOR PREP 26ML 0.7% IOD POVACRYLEX 74% ISO ALC ST

## (undated) DEVICE — SUTURE VCRL SZ 0 L36IN ABSRB UD CTX-B 1/2 CIR BLNT PNT NDL JB978

## (undated) DEVICE — KENDALL SCD EXPRESS SLEEVES, KNEE LENGTH, MEDIUM: Brand: KENDALL SCD

## (undated) DEVICE — CARDINAL HEALTH FLEXIBLE LIGHT HANDLE COVER: Brand: CARDINAL HEALTH

## (undated) DEVICE — SOLUTION IRRIG 1000ML H2O STRL BLT

## (undated) DEVICE — STERILE POLYISOPRENE POWDER-FREE SURGICAL GLOVES: Brand: PROTEXIS

## (undated) DEVICE — CURETTE VAC CRV 8 MM INDIVIDUALLY WRP STRL VACURET

## (undated) DEVICE — GLOVE SURG SZ 65 THK91MIL LTX FREE SYN POLYISOPRENE

## (undated) DEVICE — MINOR LITHOTOMY PACK: Brand: MEDLINE INDUSTRIES, INC.

## (undated) DEVICE — KIT URIN CATH L16FR BLLN 5ML INDWL STR TIP INF CTRL URIN

## (undated) DEVICE — SUTURE MCRYL SZ 4-0 L27IN ABSRB UD L19MM PS-2 1/2 CIR PRIM Y426H

## (undated) DEVICE — PENCIL ES L3M BTTN SWCH S STL HEX LOK BLDE ELECTRD HOLSTER

## (undated) DEVICE — AMD ANTIMICROBIAL NON-ADHERENT PAD,0.2% POLYHEXAMETHYLENE BIGUANIDE HCI (PHMB): Brand: TELFA

## (undated) DEVICE — SUTURE VCRL 3-0 L36IN ABSRB UD CTB-1 L36MM 1/2 CIR NDL JB944

## (undated) DEVICE — SOLUTION IRRIG 1000ML 0.9% SOD CHL USP POUR PLAS BTL

## (undated) DEVICE — U.V.A.C. SWIVEL HANDLE W/TUBING, 6': Brand: CONVERTORS

## (undated) DEVICE — GLOVE SURG SZ 65 L12IN FNGR THK79MIL GRN LTX FREE

## (undated) DEVICE — AMD ANTIMICROBIAL GAUZE SPONGES,12 PLY USP TYPE VII, 0.2% POLYHEXAMETHYLENE BIGUANIDE HCI (PHMB): Brand: CURITY

## (undated) DEVICE — SOLUTION IV 1000ML 0.9% SOD CHL

## (undated) DEVICE — DRAPE,UNDERBUTTOCKS,PCH,STERILE: Brand: MEDLINE

## (undated) DEVICE — SUTURE VICRYL SZ 2-0 L36IN ABSRB UD L36MM CT-1 1/2 CIR J945H

## (undated) DEVICE — ELECTRODE PT RET AD L9FT HI MOIST COND ADH HYDRGEL CORDED

## (undated) DEVICE — SURGICAL PROCEDURE PACK C SECT CDS